# Patient Record
Sex: FEMALE | Race: BLACK OR AFRICAN AMERICAN
[De-identification: names, ages, dates, MRNs, and addresses within clinical notes are randomized per-mention and may not be internally consistent; named-entity substitution may affect disease eponyms.]

---

## 2017-12-30 ENCOUNTER — HOSPITAL ENCOUNTER (EMERGENCY)
Dept: HOSPITAL 62 - ER | Age: 32
Discharge: HOME | End: 2017-12-30
Payer: SELF-PAY

## 2017-12-30 VITALS — DIASTOLIC BLOOD PRESSURE: 62 MMHG | SYSTOLIC BLOOD PRESSURE: 108 MMHG

## 2017-12-30 DIAGNOSIS — R10.2: ICD-10-CM

## 2017-12-30 DIAGNOSIS — O26.891: ICD-10-CM

## 2017-12-30 DIAGNOSIS — Z3A.09: ICD-10-CM

## 2017-12-30 DIAGNOSIS — O20.9: Primary | ICD-10-CM

## 2017-12-30 DIAGNOSIS — O36.0910: ICD-10-CM

## 2017-12-30 LAB
ADD MANUAL DIFF: NO
APPEARANCE UR: CLEAR
APTT PPP: YELLOW S
BASOPHILS # BLD AUTO: 0 10^3/UL (ref 0–0.2)
BASOPHILS NFR BLD AUTO: 0.4 % (ref 0–2)
BILIRUB UR QL STRIP: NEGATIVE
EOSINOPHIL # BLD AUTO: 0.1 10^3/UL (ref 0–0.6)
EOSINOPHIL NFR BLD AUTO: 1.4 % (ref 0–6)
ERYTHROCYTE [DISTWIDTH] IN BLOOD BY AUTOMATED COUNT: 13.8 % (ref 11.5–14)
GLUCOSE UR STRIP-MCNC: NEGATIVE MG/DL
HCT VFR BLD CALC: 35.6 % (ref 36–47)
HGB BLD-MCNC: 12.1 G/DL (ref 12–15.5)
KETONES UR STRIP-MCNC: 20 MG/DL
LYMPHOCYTES # BLD AUTO: 2 10^3/UL (ref 0.5–4.7)
LYMPHOCYTES NFR BLD AUTO: 24.2 % (ref 13–45)
MCH RBC QN AUTO: 29.2 PG (ref 27–33.4)
MCHC RBC AUTO-ENTMCNC: 34 G/DL (ref 32–36)
MCV RBC AUTO: 86 FL (ref 80–97)
MONOCYTES # BLD AUTO: 0.6 10^3/UL (ref 0.1–1.4)
MONOCYTES NFR BLD AUTO: 7.4 % (ref 3–13)
NEUTROPHILS # BLD AUTO: 5.4 10^3/UL (ref 1.7–8.2)
NEUTS SEG NFR BLD AUTO: 66.6 % (ref 42–78)
NITRITE UR QL STRIP: NEGATIVE
PH UR STRIP: 6 [PH] (ref 5–9)
PLATELET # BLD: 164 10^3/UL (ref 150–450)
PROT UR STRIP-MCNC: NEGATIVE MG/DL
RBC # BLD AUTO: 4.15 10^6/UL (ref 3.72–5.28)
SP GR UR STRIP: 1.01
TOTAL CELLS COUNTED % (AUTO): 100 %
UROBILINOGEN UR-MCNC: NEGATIVE MG/DL (ref ?–2)
WBC # BLD AUTO: 8.2 10^3/UL (ref 4–10.5)

## 2017-12-30 PROCEDURE — 86850 RBC ANTIBODY SCREEN: CPT

## 2017-12-30 PROCEDURE — 96361 HYDRATE IV INFUSION ADD-ON: CPT

## 2017-12-30 PROCEDURE — 76817 TRANSVAGINAL US OBSTETRIC: CPT

## 2017-12-30 PROCEDURE — 86901 BLOOD TYPING SEROLOGIC RH(D): CPT

## 2017-12-30 PROCEDURE — 99284 EMERGENCY DEPT VISIT MOD MDM: CPT

## 2017-12-30 PROCEDURE — 86900 BLOOD TYPING SEROLOGIC ABO: CPT

## 2017-12-30 PROCEDURE — 96372 THER/PROPH/DIAG INJ SC/IM: CPT

## 2017-12-30 PROCEDURE — 84702 CHORIONIC GONADOTROPIN TEST: CPT

## 2017-12-30 PROCEDURE — 36415 COLL VENOUS BLD VENIPUNCTURE: CPT

## 2017-12-30 PROCEDURE — 85025 COMPLETE CBC W/AUTO DIFF WBC: CPT

## 2017-12-30 PROCEDURE — 96374 THER/PROPH/DIAG INJ IV PUSH: CPT

## 2017-12-30 PROCEDURE — 81001 URINALYSIS AUTO W/SCOPE: CPT

## 2017-12-30 PROCEDURE — 81025 URINE PREGNANCY TEST: CPT

## 2017-12-30 NOTE — ER DOCUMENT REPORT
ED Medical Screen (RME)





- General


Chief Complaint: Vag Bleeding, +preg <12wks


Stated Complaint: VAGINAL BLEEDING/CRAMPING


Time Seen by Provider: 17 17:25


Information source: Patient


Notes: 





 003 at 13 weeks by dates with 9 days of some pelvic cramping initially 

with bright red blood.  Bleeding is now darker.  No fevers or vomiting.  No 

pain with urination.  No radiation of the pain.  Some suprapubic cramping 

without radiation.


TRAVEL OUTSIDE OF THE [place] IN LAST 30 DAYS: No





- Related Data


Allergies/Adverse Reactions: 


 





No Known Allergies Allergy (Verified 17 16:31)


 











Past Medical History


Past Surgical History: Reports: Hx Abdominal Surgery - hernia





- Immunizations


Hx Diphtheria, Pertussis, Tetanus Vaccination: Yes





Physical Exam





- Vital signs


Vitals: 





 











Temp Pulse Resp BP Pulse Ox


 


 98.5 F   77   20   110/65   100 


 


 17 16:43  17 16:43  17 16:43  17 16:43  17 16:43














Course





- Vital Signs


Vital signs: 





 











Temp Pulse Resp BP Pulse Ox


 


 98.5 F   77   20   110/65   100 


 


 17 16:43  17 16:43  17 16:43  17 16:43  17 16:43

## 2017-12-30 NOTE — ER DOCUMENT REPORT
ED GI/





- General


Mode of Arrival: Ambulatory


Information source: Patient


TRAVEL OUTSIDE OF THE [place] IN LAST 30 DAYS: No





- HPI


Patient complains to provider of: Abdominal pain - cramping


Associated symptoms: Other - see notes above





<DEBRA MAZA - Last Filed: 17 19:45>





<YENNIFER HAINES - Last Filed: 17 00:27>





- General


Chief Complaint: Vag Bleeding, +preg <12wks


Stated Complaint: VAGINAL BLEEDING/CRAMPING


Time Seen by Provider: 17 17:25


Notes: 





32 year old pregnant female () presents to the ED complaining of pelvic 

cramping and some bleeding that started 9 days ago. Patient is blood type O-. (

DEBRA MAZA)





- Related Data


Allergies/Adverse Reactions: 


 





No Known Allergies Allergy (Verified 17 16:31)


 











Past Medical History





- General


Information source: Patient





- Social History


Smoking Status: Never Smoker


Chew tobacco use (# tins/day): No


Frequency of alcohol use: None


Drug Abuse: None


Family History: Reviewed & Not Pertinent


Patient has suicidal ideation: No


Patient has homicidal ideation: No


Renal/ Medical History: Denies: Hx Peritoneal Dialysis


Past Surgical History: Reports: Hx Abdominal Surgery - hernia, Hx Herniorrhaphy





- Immunizations


Hx Diphtheria, Pertussis, Tetanus Vaccination: Yes





<DEBRA MAZA - Last Filed: 17 19:45>





Review of Systems





- Review of Systems


Constitutional: No symptoms reported


EENT: No symptoms reported


Cardiovascular: No symptoms reported


Respiratory: No symptoms reported


Gastrointestinal: No symptoms reported


Genitourinary: No symptoms reported


Female Genitourinary: See HPI, Pregnant, Vaginal bleeding, Other - pelvic 

cramping


Musculoskeletal: No symptoms reported


Skin: No symptoms reported


Hematologic/Lymphatic: No symptoms reported


Neurological/Psychological: No symptoms reported


-: Yes All other systems reviewed and negative





<DEBRA MAZA - Last Filed: 17 19:45>





Physical Exam





- General


General appearance: Alert


In distress: None





- HEENT


Head: Normocephalic, Atraumatic


Eyes: Normal


Extraocular movements intact: Yes


Pupils: PERRL





- Respiratory


Respiratory status: No respiratory distress





- Cardiovascular


Rhythm: Regular





- Abdominal


Inspection: Normal


Distension: No distension


Tenderness: Tender - Mild suprapubic tenderness to palpation





- Back


Back: Normal





- Extremities


General upper extremity: Normal inspection, Normal ROM


General lower extremity: Normal inspection, Normal ROM





- Neurological


Neuro grossly intact: Yes


Cognition: Normal


Orientation: AAOx4


Rik Coma Scale Eye Opening: Spontaneous


Rik Coma Scale Verbal: Oriented


[place] Coma Scale Motor: Obeys Commands


[place] Coma Scale Total: 15





- Psychological


Associated symptoms: Normal affect, Normal mood





- Skin


Skin Temperature: Warm


Skin Moisture: Dry


Skin Color: Normal





<DEBRA MAZA - Last Filed: 17 19:45>





- Vital signs


Vitals: 


 











Temp Pulse Resp BP Pulse Ox


 


 98.5 F   77   20   110/65   100 


 


 17 16:43  17 16:43  17 16:43  17 16:43  17 16:43














Course





- Laboratory


Result Diagrams: 


 17 19:20








<DEBRA MAZA - Last Filed: 17 19:45>





- Laboratory


Result Diagrams: 


 17 19:20








<YENNIFER HAINES - Last Filed: 17 00:27>





- Re-evaluation


Re-evalutation: 





17


Patient is a 32-year-old female with a positive home pregnancy test who comes 

in with some vaginal bleeding and cramping.  Patient is 9 weeks pregnant and 

ultrasound with also possible corpus luteal cyst.  Symptoms have resolved 

fluids.  Patient is also had nausea and this pregnancy and was given Reglan 

here.  Feels better.  Will follow up with OB/GYN.  Of note, she is Rh- and has 

received RhoGam today.


 (YENNIFER HAINES)





- Vital Signs


Vital signs: 


 











Temp Pulse Resp BP Pulse Ox


 


 98.5 F   78   18   108/62   98 


 


 17 16:43  17 22:47  17 22:47  17 22:47  17 22:47














- Laboratory


Laboratory results interpreted by me: 


 











  17





  19:20 19:20 19:53


 


Hct  35.6 L  


 


Beta HCG, Quant   595108.00 H 


 


Urine Ketones    20 H


 


Urine HCG, Qual    POSITIVE H














Discharge





<DEBRA MAZA - Last Filed: 17 19:45>





<YENNIFER HAINES - Last Filed: 17 00:27>





- Discharge


Clinical Impression: 


 Bleeding in early pregnancy, Need for rhogam due to Rh negative mother





Condition: Stable


Disposition: HOME, SELF-CARE


Instructions:  Bleeding During Early Pregnancy ([facility]), Rhogam ([facility])


Additional Instructions: 


Please follow-up with your doctor this week.


Prescriptions: 


Metoclopramide HCl [Reglan 10 mg Tablet] 1 tab PO TIDP PRN #30 tablet


 PRN Reason: 


Metoclopramide HCl [Reglan 10 mg Tablet] 1 tab PO TIDP PRN #30 tablet


 PRN Reason: 


Forms:  Return to Work


Scribe Attestation: 





17 00:26


I personally performed the services described in the documentation, reviewed 

and edited the documentation which was dictated to the scribe in my presence, 

and it accurately records my words and actions. (YENNIFER HAINES)





Scribe Documentation





- Scribe


Written by Sandra:: Sandra Umana, 2017 1948


acting as scribe for :: Rubia





<DEBRA MAZA - Last Filed: 17 19:45>

## 2017-12-30 NOTE — RADIOLOGY REPORT (SQ)
EXAM DESCRIPTION:  U/S OB TRANSVAGINAL W/O DOP



COMPLETED DATE/TIME:  12/30/2017 7:07 pm



REASON FOR STUDY:  PIT; preg with vag bleeding



COMPARISON:  None.



TECHNIQUE:  Transvaginal static and realtime grayscale images acquired of the pelvis. Additional mónica
cted spectral and color Doppler images recorded. All images stored on PACs.

bHCG: Pending.



LIMITATIONS:  None.



FINDINGS:  FETUS: Living intrauterine pregnancy.

EGA: 9 week, 1 day

LEAH: 8/3/2018

FHR: 173  beats per minute.

SUBCHORIONIC BLEED: No

SIZE OF BLEED: Not applicable.

UTERUS: No masses. No anomalies.

CERVICAL LENGTH: 4.0 cm   Closed.

RIGHT ADNEXA: Normal ovary with normal vascular flow.

No adnexal free fluid.

No adnexal masses.

LEFT ADNEXA: Normal ovary with normal vascular flow.

No adnexal free fluid.

No adnexal masses.

FREE FLUID: None.

OTHER: No other significant finding.



IMPRESSION:  LIVING INTRAUTERINE PREGNANCY.

EGA 9 weeks, 1 day

Trimester of pregnancy:  First - 0 to 13 weeks.



TECHNICAL DOCUMENTATION:  JOB ID:  [id number]

 2011 [employer]- All Rights Reserved

## 2018-02-06 ENCOUNTER — HOSPITAL ENCOUNTER (EMERGENCY)
Dept: HOSPITAL 62 - ER | Age: 33
Discharge: HOME | End: 2018-02-06
Payer: SELF-PAY

## 2018-02-06 VITALS — DIASTOLIC BLOOD PRESSURE: 56 MMHG | SYSTOLIC BLOOD PRESSURE: 108 MMHG

## 2018-02-06 DIAGNOSIS — R11.0: ICD-10-CM

## 2018-02-06 DIAGNOSIS — Z3A.14: ICD-10-CM

## 2018-02-06 DIAGNOSIS — R19.7: ICD-10-CM

## 2018-02-06 DIAGNOSIS — O41.92X0: Primary | ICD-10-CM

## 2018-02-06 DIAGNOSIS — R10.2: ICD-10-CM

## 2018-02-06 DIAGNOSIS — O26.892: ICD-10-CM

## 2018-02-06 DIAGNOSIS — R51: ICD-10-CM

## 2018-02-06 LAB
A1 MICROGLOB PLACENTAL VAG QL: POSITIVE
ADD MANUAL DIFF: NO
ALBUMIN SERPL-MCNC: 4.2 G/DL (ref 3.5–5)
ALP SERPL-CCNC: 43 U/L (ref 38–126)
ALT SERPL-CCNC: 20 U/L (ref 9–52)
ANION GAP SERPL CALC-SCNC: 10 MMOL/L (ref 5–19)
APPEARANCE UR: (no result)
APTT PPP: (no result) S
AST SERPL-CCNC: 20 U/L (ref 14–36)
BASOPHILS # BLD AUTO: 0 10^3/UL (ref 0–0.2)
BASOPHILS NFR BLD AUTO: 0.3 % (ref 0–2)
BILIRUB DIRECT SERPL-MCNC: 0 MG/DL (ref 0–0.4)
BILIRUB SERPL-MCNC: 0.4 MG/DL (ref 0.2–1.3)
BILIRUB UR QL STRIP: NEGATIVE
BUN SERPL-MCNC: 8 MG/DL (ref 7–20)
CALCIUM: 9.8 MG/DL (ref 8.4–10.2)
CHLORIDE SERPL-SCNC: 104 MMOL/L (ref 98–107)
CO2 SERPL-SCNC: 23 MMOL/L (ref 22–30)
EOSINOPHIL # BLD AUTO: 0.1 10^3/UL (ref 0–0.6)
EOSINOPHIL NFR BLD AUTO: 1.2 % (ref 0–6)
ERYTHROCYTE [DISTWIDTH] IN BLOOD BY AUTOMATED COUNT: 14.5 % (ref 11.5–14)
GLUCOSE SERPL-MCNC: 82 MG/DL (ref 75–110)
GLUCOSE UR STRIP-MCNC: NEGATIVE MG/DL
HCT VFR BLD CALC: 34.5 % (ref 36–47)
HGB BLD-MCNC: 11.5 G/DL (ref 12–15.5)
KETONES UR STRIP-MCNC: NEGATIVE MG/DL
LYMPHOCYTES # BLD AUTO: 1.2 10^3/UL (ref 0.5–4.7)
LYMPHOCYTES NFR BLD AUTO: 16.3 % (ref 13–45)
MCH RBC QN AUTO: 29.1 PG (ref 27–33.4)
MCHC RBC AUTO-ENTMCNC: 33.3 G/DL (ref 32–36)
MCV RBC AUTO: 87 FL (ref 80–97)
MONOCYTES # BLD AUTO: 0.4 10^3/UL (ref 0.1–1.4)
MONOCYTES NFR BLD AUTO: 5.1 % (ref 3–13)
NEUTROPHILS # BLD AUTO: 5.5 10^3/UL (ref 1.7–8.2)
NEUTS SEG NFR BLD AUTO: 77.1 % (ref 42–78)
NITRITE UR QL STRIP: NEGATIVE
PH UR STRIP: 9 [PH] (ref 5–9)
PLATELET # BLD: 163 10^3/UL (ref 150–450)
POTASSIUM SERPL-SCNC: 4.1 MMOL/L (ref 3.6–5)
PROT SERPL-MCNC: 6.4 G/DL (ref 6.3–8.2)
PROT UR STRIP-MCNC: NEGATIVE MG/DL
RBC # BLD AUTO: 3.95 10^6/UL (ref 3.72–5.28)
SODIUM SERPL-SCNC: 136.6 MMOL/L (ref 137–145)
SP GR UR STRIP: 1
TOTAL CELLS COUNTED % (AUTO): 100 %
UROBILINOGEN UR-MCNC: NEGATIVE MG/DL (ref ?–2)
WBC # BLD AUTO: 7.2 10^3/UL (ref 4–10.5)

## 2018-02-06 PROCEDURE — 80053 COMPREHEN METABOLIC PANEL: CPT

## 2018-02-06 PROCEDURE — 81001 URINALYSIS AUTO W/SCOPE: CPT

## 2018-02-06 PROCEDURE — 84112 EVAL AMNIOTIC FLUID PROTEIN: CPT

## 2018-02-06 PROCEDURE — 76815 OB US LIMITED FETUS(S): CPT

## 2018-02-06 PROCEDURE — 36415 COLL VENOUS BLD VENIPUNCTURE: CPT

## 2018-02-06 PROCEDURE — 96376 TX/PRO/DX INJ SAME DRUG ADON: CPT

## 2018-02-06 PROCEDURE — 86900 BLOOD TYPING SEROLOGIC ABO: CPT

## 2018-02-06 PROCEDURE — 96375 TX/PRO/DX INJ NEW DRUG ADDON: CPT

## 2018-02-06 PROCEDURE — 86901 BLOOD TYPING SEROLOGIC RH(D): CPT

## 2018-02-06 PROCEDURE — 84702 CHORIONIC GONADOTROPIN TEST: CPT

## 2018-02-06 PROCEDURE — 99284 EMERGENCY DEPT VISIT MOD MDM: CPT

## 2018-02-06 PROCEDURE — 85025 COMPLETE CBC W/AUTO DIFF WBC: CPT

## 2018-02-06 PROCEDURE — 96374 THER/PROPH/DIAG INJ IV PUSH: CPT

## 2018-02-06 PROCEDURE — 96361 HYDRATE IV INFUSION ADD-ON: CPT

## 2018-02-06 NOTE — ER DOCUMENT REPORT
ED GI/





- General


Chief Complaint: Abdominal Pain


Stated Complaint: PREGNANCY PROBLEM


Time Seen by Provider: 18 08:26


Mode of Arrival: Ambulatory


Information source: Patient


Notes: 





She is currently 14 weeks pregnant .  Patient presents stating that she 

developed lower cramping to the abdomen today and has been having clear fluid 

leaking vaginally.  Patient denies any vaginal bleeding.  Patient does complain 

of some nausea, diarrhea and headache symptoms.  Patient denies any vomiting.


TRAVEL OUTSIDE OF THE [place] IN LAST 30 DAYS: No





- HPI


Patient complains to provider of: Pelvic pain, Pregnant


Timing/Duration: Gradual


Quality of pain: Cramping


Pain Level: 3


Context: Pregnant


Location: Pelvis


Vaginal bleeding (Compared to normal period): None


Sexual history: Active


Associated symptoms: Nausea, Vaginal discharge.  denies: Dysuria, Fever, 

Urinary hesitancy, Urinary frequency, Urinary retention, Urinary urgency, 

Vomiting


Exacerbated by: Denies


Relieved by: Denies


Similar symptoms previously: No


Recently seen / treated by doctor: No





- Related Data


Allergies/Adverse Reactions: 


 





No Known Allergies Allergy (Verified 18 08:17)


 











Past Medical History





- General


Information source: Patient





- Social History


Smoking Status: Never Smoker


Frequency of alcohol use: None


Drug Abuse: None


Occupation: Call center


Lives with: Family


Family History: Reviewed & Not Pertinent





- Medical History


Medical History: Negative


Renal/ Medical History: Denies: Hx Peritoneal Dialysis


Past Surgical History: Reports: Hx Abdominal Surgery - hernia, Hx Herniorrhaphy





- Immunizations


Hx Diphtheria, Pertussis, Tetanus Vaccination: Yes





Review of Systems





- Review of Systems


Constitutional: No symptoms reported.  denies: Fever, Recent illness


EENT: No symptoms reported


Cardiovascular: No symptoms reported.  denies: Chest pain


Respiratory: No symptoms reported.  denies: Cough, Short of breath


Gastrointestinal: Abdominal pain, Nausea.  denies: Diarrhea, Vomiting


Genitourinary: No symptoms reported.  denies: Dysuria, Flank pain


Female Genitourinary: Pregnant, Vaginal discharge - Clear fluid leaking


Musculoskeletal: No symptoms reported


Skin: No symptoms reported


Hematologic/Lymphatic: No symptoms reported


Neurological/Psychological: Headaches.  denies: Confusion, Weakness





Physical Exam





- Vital signs


Vitals: 


 











Temp Pulse Resp BP Pulse Ox


 


 98.8 F   86   20   121/60   100 


 


 18 08:23  18 08:23  18 08:23  18 08:23  18 08:23














- General


General appearance: Appears well, Alert, Anxious


In distress: None





- Respiratory


Respiratory status: No respiratory distress


Chest status: Nontender


Breath sounds: Normal.  No: Rales, Rhonchi, Stridor, Wheezing


Chest palpation: Normal





- Cardiovascular


Rhythm: Regular


Heart sounds: S1 appreciated, S2 appreciated


Murmur: No





- Abdominal


Inspection: Normal


Distension: No distension


Bowel sounds: Normal


Tenderness: Tender - lower pelvic


Organomegaly: No organomegaly





- Back


Back: Normal, Nontender.  No: CVA tenderness





- Extremities


General upper extremity: Normal inspection, Nontender, Normal strength


General lower extremity: Normal inspection, Nontender, Normal strength





- Neurological


Neuro grossly intact: Yes


Cognition: Normal


[place] Coma Scale Eye Opening: Spontaneous


Rik Coma Scale Verbal: Oriented


[place] Coma Scale Motor: Obeys Commands


[place] Coma Scale Total: 15





- Psychological


Associated symptoms: Anxious





- Skin


Skin Temperature: Warm


Skin Moisture: Dry


Skin Color: Normal





Course





- Re-evaluation


Re-evalutation: 





18 10:21


Dr. Ribera called with reports of ultrasound, consulted with Dr. Pearce who 

recommends adding on an amateur test and then consulting with OB/GYN regarding 

the results.


18 10:30


Consulted with Dr. Pavon regarding patient presentation.  Dr. Pavon states that 

there is nothing that can be done to stop the amniotic fluid leak and advises 

having patient follow up with the OB/GYN as an outpatient basis.  Suspect that 

patient will likely miscarry.


18 11:07


Amnisure test positive for amniotic fluid.  Patient advised of conversation 

with OB/GYN provider as well as results of diagnostic test.  Patient encouraged 

to return as needed for any worsening symptoms.  Patient encouraged to stay well

-hydrated at home.





- Vital Signs


Vital signs: 


 











Temp Pulse Resp BP Pulse Ox


 


 98.5 F   65   20   108/56 L  100 


 


 18 12:21  18 12:21  18 08:23  18 12:21  18 12:21














- Laboratory


Result Diagrams: 


 18 09:18





 18 09:18


Laboratory results interpreted by me: 


 











  18





  09:18 09:18 10:47


 


Hgb  11.5 L  


 


Hct  34.5 L  


 


RDW  14.5 H  


 


Sodium   136.6 L 


 


Beta HCG, Quant   83659.00 H 


 


Fetal Membrane Rupture    POSITIVE H














 Labs- Entire Visit











  18





  09:18 09:18 09:18


 


WBC  7.2  


 


RBC  3.95  


 


Hgb  11.5 L  


 


Hct  34.5 L  


 


MCV  87  


 


MCH  29.1  


 


MCHC  33.3  


 


RDW  14.5 H  


 


Plt Count  163  


 


Seg Neutrophils %  77.1  


 


Lymphocytes %  16.3  


 


Monocytes %  5.1  


 


Eosinophils %  1.2  


 


Basophils %  0.3  


 


Absolute Neutrophils  5.5  


 


Absolute Lymphocytes  1.2  


 


Absolute Monocytes  0.4  


 


Absolute Eosinophils  0.1  


 


Absolute Basophils  0.0  


 


Sodium   136.6 L 


 


Potassium   4.1 


 


Chloride   104 


 


Carbon Dioxide   23 


 


Anion Gap   10 


 


BUN   8 


 


Creatinine   0.53 


 


Est GFR ( Amer)   > 60 


 


Est GFR (Non-Af Amer)   > 60 


 


Glucose   82 


 


Calcium   9.8 


 


Total Bilirubin   0.4 


 


Direct Bilirubin   0.0 


 


Neonat Total Bilirubin   Not Reportable 


 


Neonat Direct Bilirubin   Not Reportable 


 


Neonat Indirect Bili   Not Reportable 


 


AST   20 


 


ALT   20 


 


Alkaline Phosphatase   43 


 


Total Protein   6.4 


 


Albumin   4.2 


 


Beta HCG, Quant   76845.00 H 


 


Total Beta HCG   POSITIVE 


 


Urine Color   


 


Urine Appearance   


 


Urine pH   


 


Ur Specific Gravity   


 


Urine Protein   


 


Urine Glucose (UA)   


 


Urine Ketones   


 


Urine Blood   


 


Urine Nitrite   


 


Urine Bilirubin   


 


Urine Urobilinogen   


 


Ur Leukocyte Esterase   


 


Urine WBC (Auto)   


 


Urine RBC (Auto)   


 


Squamous Epi Cells Auto   


 


Urine Mucus (Auto)   


 


Urine Ascorbic Acid   


 


Fetal Membrane Rupture   


 


Blood Type    O NEGATIVE


 


Rhogam Indicated    RHOGAM NOT REQUESTED














  18





  09:54 10:47


 


WBC  


 


RBC  


 


Hgb  


 


Hct  


 


MCV  


 


MCH  


 


MCHC  


 


RDW  


 


Plt Count  


 


Seg Neutrophils %  


 


Lymphocytes %  


 


Monocytes %  


 


Eosinophils %  


 


Basophils %  


 


Absolute Neutrophils  


 


Absolute Lymphocytes  


 


Absolute Monocytes  


 


Absolute Eosinophils  


 


Absolute Basophils  


 


Sodium  


 


Potassium  


 


Chloride  


 


Carbon Dioxide  


 


Anion Gap  


 


BUN  


 


Creatinine  


 


Est GFR ( Amer)  


 


Est GFR (Non-Af Amer)  


 


Glucose  


 


Calcium  


 


Total Bilirubin  


 


Direct Bilirubin  


 


Neonat Total Bilirubin  


 


Neonat Direct Bilirubin  


 


Neonat Indirect Bili  


 


AST  


 


ALT  


 


Alkaline Phosphatase  


 


Total Protein  


 


Albumin  


 


Beta HCG, Quant  


 


Total Beta HCG  


 


Urine Color  STRAW 


 


Urine Appearance  SLIGHTLY-CLOUDY 


 


Urine pH  9.0 


 


Ur Specific Gravity  1.004 


 


Urine Protein  NEGATIVE 


 


Urine Glucose (UA)  NEGATIVE 


 


Urine Ketones  NEGATIVE 


 


Urine Blood  NEGATIVE 


 


Urine Nitrite  NEGATIVE 


 


Urine Bilirubin  NEGATIVE 


 


Urine Urobilinogen  NEGATIVE 


 


Ur Leukocyte Esterase  NEGATIVE 


 


Urine WBC (Auto)  0 


 


Urine RBC (Auto)  0 


 


Squamous Epi Cells Auto  1 


 


Urine Mucus (Auto)  RARE 


 


Urine Ascorbic Acid  NEGATIVE 


 


Fetal Membrane Rupture   POSITIVE H


 


Blood Type  


 


Rhogam Indicated  














- Diagnostic Test


Radiology reviewed: Reports reviewed





Discharge





- Discharge


Clinical Impression: 


 Abdominal pain affecting pregnancy





Amniotic fluid abnormal


Qualifiers:


 Fetus number: single or unspecified fetus Trimester: second trimester 

Qualified Code(s): O41.92X0 - Disorder of amniotic fluid and membranes, 

unspecified, second trimester, not applicable or unspecified





Condition: Stable


Disposition: HOME, SELF-CARE


Instructions:  Acetaminophen, Headache ([facility]), Intravenous (IV) Fluids ([facility])


Additional Instructions: 


Return immediately for any new or worsening symptoms





Followup with your OB/GYN care provider, call today to make a followup 

appointment





Stay well-hydrated





Pelvic rest until cleared by your OB/GYN provider








Forms:  Return to Work


Referrals: 


[facility] [Provider Group] - Follow up as needed


[facility] - Follow up tomorrow

## 2018-02-06 NOTE — RADIOLOGY REPORT (SQ)
EXAM DESCRIPTION:  U/S OB LIMITED



COMPLETED DATE/TIME:  2/6/2018 9:15 am



REASON FOR STUDY:  pelvic/low back pain, leaking fluid



COMPARISON:  OB ultrasound 12/30/2017



TECHNIQUE:  Limited transabdominal grayscale ultrasound for evaluation of specific requested obstetri
susana parameters.



LIMITATIONS:  None.



FINDINGS:  CERVICAL LENGTH: 4.3 cm   Closed.

KASSIDY: Largest pocket 1 cm.  There is oligohydramnios.

FHR: 149 beats per minute.

PRESENTATION: Transverse

OTHER: No other significant findings.



IMPRESSION:  Oligohydramnios.

14 week 2 day fetus, heart rate 149 beats per minute.

Trimester of pregnancy: Second trimester - 13 weeks 1 day to 27 weeks 6 days.



TECHNICAL DOCUMENTATION:  JOB ID:  [id number]

 2011 [employer]- All Rights Reserved

## 2018-03-21 ENCOUNTER — HOSPITAL ENCOUNTER (INPATIENT)
Dept: HOSPITAL 62 - LC | Age: 33
LOS: 2 days | Discharge: HOME | DRG: 781 | End: 2018-03-23
Attending: OBSTETRICS & GYNECOLOGY | Admitting: OBSTETRICS & GYNECOLOGY
Payer: MEDICAID

## 2018-03-21 DIAGNOSIS — G43.909: ICD-10-CM

## 2018-03-21 DIAGNOSIS — O23.512: ICD-10-CM

## 2018-03-21 DIAGNOSIS — Z3A.20: ICD-10-CM

## 2018-03-21 DIAGNOSIS — O42.90: Primary | ICD-10-CM

## 2018-03-21 DIAGNOSIS — O99.352: ICD-10-CM

## 2018-03-21 LAB
ADD MANUAL DIFF: NO
APPEARANCE UR: (no result)
APTT PPP: YELLOW S
BARBITURATES UR QL SCN: NEGATIVE
BASOPHILS # BLD AUTO: 0 10^3/UL (ref 0–0.2)
BASOPHILS NFR BLD AUTO: 0.2 % (ref 0–2)
BILIRUB UR QL STRIP: NEGATIVE
EOSINOPHIL # BLD AUTO: 0 10^3/UL (ref 0–0.6)
EOSINOPHIL NFR BLD AUTO: 0.4 % (ref 0–6)
ERYTHROCYTE [DISTWIDTH] IN BLOOD BY AUTOMATED COUNT: 13.8 % (ref 11.5–14)
GLUCOSE UR STRIP-MCNC: NEGATIVE MG/DL
HCT VFR BLD CALC: 31.2 % (ref 36–47)
HGB BLD-MCNC: 10.5 G/DL (ref 12–15.5)
KETONES UR STRIP-MCNC: (no result) MG/DL
LYMPHOCYTES # BLD AUTO: 0.8 10^3/UL (ref 0.5–4.7)
LYMPHOCYTES NFR BLD AUTO: 8 % (ref 13–45)
MCH RBC QN AUTO: 29.7 PG (ref 27–33.4)
MCHC RBC AUTO-ENTMCNC: 33.6 G/DL (ref 32–36)
MCV RBC AUTO: 89 FL (ref 80–97)
METHADONE UR QL SCN: NEGATIVE
MONOCYTES # BLD AUTO: 0.6 10^3/UL (ref 0.1–1.4)
MONOCYTES NFR BLD AUTO: 5.3 % (ref 3–13)
NEUTROPHILS # BLD AUTO: 8.9 10^3/UL (ref 1.7–8.2)
NEUTS SEG NFR BLD AUTO: 86.1 % (ref 42–78)
NITRITE UR QL STRIP: NEGATIVE
PCP UR QL SCN: NEGATIVE
PH UR STRIP: 7 [PH] (ref 5–9)
PLATELET # BLD: 164 10^3/UL (ref 150–450)
PROT UR STRIP-MCNC: NEGATIVE MG/DL
RBC # BLD AUTO: 3.53 10^6/UL (ref 3.72–5.28)
SP GR UR STRIP: 1
TOTAL CELLS COUNTED % (AUTO): 100 %
URINE AMPHETAMINES SCREEN: NEGATIVE
URINE BENZODIAZEPINES SCREEN: NEGATIVE
URINE COCAINE SCREEN: NEGATIVE
URINE MARIJUANA (THC) SCREEN: (no result)
UROBILINOGEN UR-MCNC: 4 MG/DL (ref ?–2)
WBC # BLD AUTO: 10.4 10^3/UL (ref 4–10.5)

## 2018-03-21 PROCEDURE — 87210 SMEAR WET MOUNT SALINE/INK: CPT

## 2018-03-21 PROCEDURE — 36415 COLL VENOUS BLD VENIPUNCTURE: CPT

## 2018-03-21 PROCEDURE — G0378 HOSPITAL OBSERVATION PER HR: HCPCS

## 2018-03-21 PROCEDURE — 85025 COMPLETE CBC W/AUTO DIFF WBC: CPT

## 2018-03-21 PROCEDURE — G0379 DIRECT REFER HOSPITAL OBSERV: HCPCS

## 2018-03-21 PROCEDURE — 87591 N.GONORRHOEAE DNA AMP PROB: CPT

## 2018-03-21 PROCEDURE — 80307 DRUG TEST PRSMV CHEM ANLYZR: CPT

## 2018-03-21 PROCEDURE — 87491 CHLMYD TRACH DNA AMP PROBE: CPT

## 2018-03-21 PROCEDURE — G0480 DRUG TEST DEF 1-7 CLASSES: HCPCS

## 2018-03-21 PROCEDURE — 81001 URINALYSIS AUTO W/SCOPE: CPT

## 2018-03-21 PROCEDURE — 76815 OB US LIMITED FETUS(S): CPT

## 2018-03-21 NOTE — ADMISSION PHYSICAL
=================================================================



=================================================================

Datetime Report Generated by CPN: 2018 20:06

   

   

=================================================================

CURRENT ADMISSION

=================================================================

   

Chief Complaint:  Uterine Contractions; Suspected Ruptured Membranes

Indication for Induction:  Not Applicable

Admit Plan:  Admit to Unit; Observation/Evaluation

   

=================================================================

ALLERGIES

=================================================================

   

Medication Allergies:  No

Medication Allergies:  No Known Allergies (2018)

Latex:  Latex Allergies

Food Allergies:  N/A

   

=================================================================

OBSTETRICAL HISTORY

=================================================================

   

EDC:  2018 00:00

:  4

Para:  2

:  1

Living:  3

Gestational Diabetes:  No

Rh Sensitization:  No

Incompetent Cervix:  No

WILBERTO:  No

Infertility:  No

ART Treatment:  No

Uterine Anomaly:  No

IUGR:  No

Hx Previous C/S:  No

Macrosomia:  No

Hx Loss/Stillborn:  No

PIH:  No

Hx  Death:  No

Placenta Previa/Abruption:  No

Depression/PP Depression:  No

PTL/PROM:  No

Post Partum Hemorrhage:  No

Current Pregnancy Procedures:  Ultrasound

Obstetrical History Comments:  G1 - , , boy 41 weeks

   G2 - , , boy 40 weeks

   G3 - 2011, , boy, 36 weeks

   G4 - current pregnancy

   

=================================================================

***SEE PRENATAL RECORDS***

=================================================================

   

Alcohol:  No

Marijuana :  No

Cocaine:  No

Other Illicit Drugs:  No

Cigarettes:  Never Smoker. [id number]

   

=================================================================

MEDICAL HISTORY

=================================================================

   

Diabetes:  No

Blood Transfusion:  No

Pulmonary Disease (Asthma, TB):  No

Breast Disease:  No

Hypertension:  No

Gyn Surgery:  No

Heart Disease:  No

Hosp/Surgery:  No

Autoimmune Disorder:  No

Anesthetic Complications:  No

Kidney Disease:  No

Abnormal Pap Smear:  Yes

Neuro/Epilepsy:  No

Psychiatric Disorders:  No

Other Medical Diseases:  No

Hepatitis/Liver Disease:  No

Significant Family History:  No

Varicosities/Phlebitis:  No

Trauma/Violence :  No

Thyroid Dysfunction:  No

Medical History Comments:  Colpo, Chronic cervicitis, Migraines

   

=================================================================

INFECTIOUS HISTORY

=================================================================

   

Gonorrhea:  No

Genital Herpes:  No

Chlamydia:  No

Tuberculosis:  No

Syphilis:  No

Hepatitis:  No

HIV/AIDS Exposure:  No

Rash or Viral Illness:  No

HPV:  No

   

=================================================================

PHYSICAL EXAM

=================================================================

   

General:  Normal

HEENT:  Normal

Neurologic:  Normal

Thyroid:  Normal

Heart:  Normal

Lungs:  Normal

Breast:  Deferred

Back:  Normal

Abdomen:  Normal

Genitourinary Exam:  Normal

Extremities:  Normal

DTRs:  Normal

Pelvic Type:  Adequate

   

=================================================================

VAGINAL EXAM

=================================================================

   

Dilatation:  clposed

   

=================================================================

FETUS A

=================================================================

   

EGA:  20.5

Admit Comment:  SROM at 14 weeks c/o contractions and continuous

   leakage of fluid

   

=================================================================

PLANS FOR LABOR AND DELIVERY

=================================================================

   

Pain Management:  Medications; Epidural

   

=================================================================

INFORMED CONSENT

=================================================================

   

Signature:  Electronically signed by Roosevelt Pavon MD ([email or web address]) on

   3/21/2018 at 20:06  with User ID: [email or web address]

## 2018-03-22 LAB
BACTERIA (WET MOUNT): (no result)
CHLAM PCR: NOT DETECTED
EPITHELIALS (WET MOUNT): (no result)
GON PCR: NOT DETECTED
RBCS (WET MOUNT): (no result)
T.VAGINALIS (WET MOUNT): (no result)
WBCS (WET MOUNT): (no result)
YEAST (WET MOUNT): (no result)

## 2018-03-22 NOTE — L&D PROGRESS NOTES
=================================================================

PROGRESS NOTES

=================================================================

Datetime Report Generated by CPN: 03/22/2018 19:05

   

   

=================================================================

PROGRESS NOTE

=================================================================

   

Impression:  Premature Rupture of Membranes

Procedures- Other:  pain

Plan:  Continue Present Management

Informed Consent Obtained:  Vaginal Delivery; Risks, Benefits and

   Alternatives Discussed

Vital Signs :  Reviewed

Comment:  Pt continues to report abdominal and back pain and has

   requested narcotics several times today.  She is reporting irregular

   contractions.  Long discussion with patient regarding  Plan of care.

   REviewed that while she was needing pain medication so frequently

   would recommend she remain in hospital.  Will get repeat CBC in am. 

   Reviewed cont with FHTs - if absence of FHTs will proceed with IOL.

   Patient tearful about prognosis but understands plan of care

   

=================================================================

VAGINAL EXAM

=================================================================

   

Dilatation:  clposed

Contractions:  irreg

   

=================================================================

SIGNATURE

=================================================================

   

SIGNATURE:  [id number]

SIGNATURE:  [id number]

Signature:  Electronically signed by Lorna Patricia MD ([email or web address]) on

   3/22/2018 at 19:05  with User ID: [email or web address]

## 2018-03-22 NOTE — RADIOLOGY REPORT (SQ)
EXAM DESCRIPTION:  U/S OB LIMITED



COMPLETED DATE/TIME:  3/22/2018 10:21 am



REASON FOR STUDY:  20+5ega, PPROM at 14wks, eval fluid/FHR



COMPARISON:  2/6/2018



TECHNIQUE:  Limited transabdominal grayscale ultrasound for evaluation of specific requested obstetri
susana parameters.



LIMITATIONS:  None.



FINDINGS:  CERVICAL LENGTH: Not seen.

KASSIDY: No measurable fluid was seen.  .

FHR: 160 beats per minute.

PRESENTATION: Breech

OTHER: No other significant findings.



IMPRESSION:  Limited obstetrical ultrasound showing no measurable amniotic fluid.  Fetal heart rate w
as 160 beats p.m. gestational age 20 weeks 6 days with estimated date of delivery of 8/3/2018

Trimester of pregnancy: Second trimester - 13 weeks 1 day to 27 weeks 6 days.



TECHNICAL DOCUMENTATION:  JOB ID:  [id number]

 2011 [employer]- All Rights Reserved



Reading location - IP/workstation name: [email or web address]

## 2018-03-23 LAB
ADD MANUAL DIFF: NO
BASOPHILS # BLD AUTO: 0 10^3/UL (ref 0–0.2)
BASOPHILS NFR BLD AUTO: 0.2 % (ref 0–2)
EOSINOPHIL # BLD AUTO: 0.1 10^3/UL (ref 0–0.6)
EOSINOPHIL NFR BLD AUTO: 1.6 % (ref 0–6)
ERYTHROCYTE [DISTWIDTH] IN BLOOD BY AUTOMATED COUNT: 14 % (ref 11.5–14)
HCT VFR BLD CALC: 27.5 % (ref 36–47)
HGB BLD-MCNC: 9.2 G/DL (ref 12–15.5)
LYMPHOCYTES # BLD AUTO: 1.1 10^3/UL (ref 0.5–4.7)
LYMPHOCYTES NFR BLD AUTO: 16.7 % (ref 13–45)
MCH RBC QN AUTO: 29.7 PG (ref 27–33.4)
MCHC RBC AUTO-ENTMCNC: 33.4 G/DL (ref 32–36)
MCV RBC AUTO: 89 FL (ref 80–97)
MONOCYTES # BLD AUTO: 0.4 10^3/UL (ref 0.1–1.4)
MONOCYTES NFR BLD AUTO: 7 % (ref 3–13)
NEUTROPHILS # BLD AUTO: 4.7 10^3/UL (ref 1.7–8.2)
NEUTS SEG NFR BLD AUTO: 74.5 % (ref 42–78)
PLATELET # BLD: 158 10^3/UL (ref 150–450)
RBC # BLD AUTO: 3.09 10^6/UL (ref 3.72–5.28)
TOTAL CELLS COUNTED % (AUTO): 100 %
WBC # BLD AUTO: 6.3 10^3/UL (ref 4–10.5)

## 2018-03-23 NOTE — L&D PROGRESS NOTES
=================================================================

PROGRESS NOTES

=================================================================

Datetime Report Generated by CPN: 2018 10:49

   

   

=================================================================

PROGRESS NOTE

=================================================================

   

Impression:  Premature Rupture of Membranes

Informed Consent Obtained:  Risks, Benefits and Alternatives Discussed

Comment:  33 yo  here for PPROM at 14 weeks

   pt is currently 20w5d

   EDC 8/3/18

   RH negative

   Rhogam 2017

   discussed with pt/ BV will treat with flagyl

   pain well managed

   abdomen nontender

   pt afebrile 

   precautions reviewed/ signs and symptoms of infection reviewed

   rx for dilaudid given

   pt to follow up in office on monday

      

   

=================================================================

FETUS C

=================================================================

   

SIGNATURE:  [id number]

Assignment:  Hailey Calabrese MD

Signature:  Electronically signed by Iris Hudson CNM on 3/23/2018 at

   10:48  with User ID: [email or web address]

:  Electronically signed by Iris Hudson CNM on 3/23/2018 at 10:48 

   with User ID: [email or web address]

## 2018-04-02 ENCOUNTER — HOSPITAL ENCOUNTER (INPATIENT)
Dept: HOSPITAL 62 - LC | Age: 33
LOS: 1 days | Discharge: HOME | End: 2018-04-03
Attending: OBSTETRICS & GYNECOLOGY | Admitting: OBSTETRICS & GYNECOLOGY
Payer: MEDICAID

## 2018-04-02 DIAGNOSIS — Z37.1: ICD-10-CM

## 2018-04-02 DIAGNOSIS — O36.4XX0: Primary | ICD-10-CM

## 2018-04-02 DIAGNOSIS — O42.112: ICD-10-CM

## 2018-04-02 DIAGNOSIS — O26.92: ICD-10-CM

## 2018-04-02 DIAGNOSIS — Z67.41: ICD-10-CM

## 2018-04-02 DIAGNOSIS — D62: ICD-10-CM

## 2018-04-02 DIAGNOSIS — Z3A.22: ICD-10-CM

## 2018-04-02 LAB
ADD MANUAL DIFF: NO
ALBUMIN SERPL-MCNC: 3.9 G/DL (ref 3.5–5)
ALP SERPL-CCNC: 68 U/L (ref 38–126)
ALT SERPL-CCNC: 12 U/L (ref 9–52)
ANION GAP SERPL CALC-SCNC: 9 MMOL/L (ref 5–19)
APPEARANCE UR: (no result)
APTT BLD: 27.4 SEC (ref 23.5–35.8)
APTT PPP: YELLOW S
AST SERPL-CCNC: 19 U/L (ref 14–36)
BARBITURATES UR QL SCN: NEGATIVE
BASOPHILS # BLD AUTO: 0 10^3/UL (ref 0–0.2)
BASOPHILS NFR BLD AUTO: 0.2 % (ref 0–2)
BILIRUB DIRECT SERPL-MCNC: 0.2 MG/DL (ref 0–0.4)
BILIRUB SERPL-MCNC: 0.7 MG/DL (ref 0.2–1.3)
BILIRUB UR QL STRIP: NEGATIVE
BUN SERPL-MCNC: 4 MG/DL (ref 7–20)
CALCIUM: 10.2 MG/DL (ref 8.4–10.2)
CHLORIDE SERPL-SCNC: 100 MMOL/L (ref 98–107)
CO2 SERPL-SCNC: 25 MMOL/L (ref 22–30)
EOSINOPHIL # BLD AUTO: 0 10^3/UL (ref 0–0.6)
EOSINOPHIL NFR BLD AUTO: 0.2 % (ref 0–6)
ERYTHROCYTE [DISTWIDTH] IN BLOOD BY AUTOMATED COUNT: 13.7 % (ref 11.5–14)
FIBRINOGEN PPP-MCNC: 431 MG/DL (ref 209–497)
GLUCOSE SERPL-MCNC: 84 MG/DL (ref 75–110)
GLUCOSE UR STRIP-MCNC: NEGATIVE MG/DL
HCT VFR BLD CALC: 34.3 % (ref 36–47)
HGB BLD-MCNC: 11.5 G/DL (ref 12–15.5)
INR PPP: 0.93
KETONES UR STRIP-MCNC: 20 MG/DL
LYMPHOCYTES # BLD AUTO: 0.9 10^3/UL (ref 0.5–4.7)
LYMPHOCYTES NFR BLD AUTO: 9 % (ref 13–45)
MCH RBC QN AUTO: 29.4 PG (ref 27–33.4)
MCHC RBC AUTO-ENTMCNC: 33.6 G/DL (ref 32–36)
MCV RBC AUTO: 88 FL (ref 80–97)
METHADONE UR QL SCN: NEGATIVE
MONOCYTES # BLD AUTO: 0.4 10^3/UL (ref 0.1–1.4)
MONOCYTES NFR BLD AUTO: 4.2 % (ref 3–13)
NEUTROPHILS # BLD AUTO: 8.3 10^3/UL (ref 1.7–8.2)
NEUTS SEG NFR BLD AUTO: 86.4 % (ref 42–78)
NITRITE UR QL STRIP: NEGATIVE
PCP UR QL SCN: NEGATIVE
PH UR STRIP: 9 [PH] (ref 5–9)
PLATELET # BLD: 200 10^3/UL (ref 150–450)
POTASSIUM SERPL-SCNC: 3.8 MMOL/L (ref 3.6–5)
PROT SERPL-MCNC: 6.5 G/DL (ref 6.3–8.2)
PROT UR STRIP-MCNC: NEGATIVE MG/DL
PROTHROMBIN TIME: 13.1 SEC (ref 11.4–15.4)
RBC # BLD AUTO: 3.92 10^6/UL (ref 3.72–5.28)
SODIUM SERPL-SCNC: 133.5 MMOL/L (ref 137–145)
SP GR UR STRIP: 1
TOTAL CELLS COUNTED % (AUTO): 100 %
URINE AMPHETAMINES SCREEN: NEGATIVE
URINE BENZODIAZEPINES SCREEN: NEGATIVE
URINE COCAINE SCREEN: NEGATIVE
URINE MARIJUANA (THC) SCREEN: NEGATIVE
UROBILINOGEN UR-MCNC: NEGATIVE MG/DL (ref ?–2)
WBC # BLD AUTO: 9.6 10^3/UL (ref 4–10.5)

## 2018-04-02 PROCEDURE — 86592 SYPHILIS TEST NON-TREP QUAL: CPT

## 2018-04-02 PROCEDURE — 76815 OB US LIMITED FETUS(S): CPT

## 2018-04-02 PROCEDURE — 85610 PROTHROMBIN TIME: CPT

## 2018-04-02 PROCEDURE — 85461 HEMOGLOBIN FETAL: CPT

## 2018-04-02 PROCEDURE — 86920 COMPATIBILITY TEST SPIN: CPT

## 2018-04-02 PROCEDURE — 80053 COMPREHEN METABOLIC PANEL: CPT

## 2018-04-02 PROCEDURE — 86901 BLOOD TYPING SEROLOGIC RH(D): CPT

## 2018-04-02 PROCEDURE — 36415 COLL VENOUS BLD VENIPUNCTURE: CPT

## 2018-04-02 PROCEDURE — 85362 FIBRIN DEGRADATION PRODUCTS: CPT

## 2018-04-02 PROCEDURE — 86850 RBC ANTIBODY SCREEN: CPT

## 2018-04-02 PROCEDURE — 86900 BLOOD TYPING SEROLOGIC ABO: CPT

## 2018-04-02 PROCEDURE — 85730 THROMBOPLASTIN TIME PARTIAL: CPT

## 2018-04-02 PROCEDURE — 85027 COMPLETE CBC AUTOMATED: CPT

## 2018-04-02 PROCEDURE — 81001 URINALYSIS AUTO W/SCOPE: CPT

## 2018-04-02 PROCEDURE — 85025 COMPLETE CBC W/AUTO DIFF WBC: CPT

## 2018-04-02 PROCEDURE — 80307 DRUG TEST PRSMV CHEM ANLYZR: CPT

## 2018-04-02 PROCEDURE — 88305 TISSUE EXAM BY PATHOLOGIST: CPT

## 2018-04-02 PROCEDURE — 85384 FIBRINOGEN ACTIVITY: CPT

## 2018-04-02 RX ADMIN — HYDROMORPHONE HYDROCHLORIDE PRN MG: 2 INJECTION INTRAMUSCULAR; INTRAVENOUS; SUBCUTANEOUS at 15:20

## 2018-04-02 RX ADMIN — HYDROMORPHONE HYDROCHLORIDE PRN MG: 2 INJECTION INTRAMUSCULAR; INTRAVENOUS; SUBCUTANEOUS at 17:52

## 2018-04-02 NOTE — ADMISSION PHYSICAL
=================================================================



=================================================================

Datetime Report Generated by CPN: 2018 13:56

   

   

=================================================================

CURRENT ADMISSION

=================================================================

   

Prenatal Hx Assessment:  The Prenatal History has been Reviewed and is

   Current

Chief Complaint:  Uterine Contractions

Indication for Induction:  Not Applicable

Admit Impression :  , Intrauterine Pregnancy; Active Labor;

   Ruptured Membranes

Admit Plan:  Admit to Unit

   

=================================================================

ALLERGIES

=================================================================

   

Medication Allergies:  No

Medication Allergies:  Latex, Natural Rubber (2018)

Latex:  Latex Allergies

Food Allergies:  N/A

   

=================================================================

OBSTETRICAL HISTORY

=================================================================

   

EDC:  2018 00:00

:  4

Para:  2

:  1

Living:  3

Gestational Diabetes:  No

Rh Sensitization:  No

Incompetent Cervix:  No

WILBERTO:  No

Infertility:  No

ART Treatment:  No

Uterine Anomaly:  No

IUGR:  No

Hx Previous C/S:  No

Macrosomia:  No

Hx Loss/Stillborn:  No

PIH:  No

Hx  Death:  No

Placenta Previa/Abruption:  No

Depression/PP Depression:  No

PTL/PROM:  No

Post Partum Hemorrhage:  No

Current Pregnancy Procedures:  Ultrasound

Obstetrical History Comments:  G1 - , , boy 41 weeks

   G2 - 2006, , boy 40 weeks

   G3 - 2011, , boy, 36 weeks

   G4 - current pregnancy

   

=================================================================

***SEE PRENATAL RECORDS***

=================================================================

   

Alcohol:  No

Marijuana :  No

Cocaine:  No

Other Illicit Drugs:  No

Cigarettes:  Never Smoker. [id number]

   

=================================================================

MEDICAL HISTORY

=================================================================

   

Diabetes:  No

Blood Transfusion:  No

Pulmonary Disease (Asthma, TB):  No

Breast Disease:  No

Hypertension:  No

Gyn Surgery:  No

Heart Disease:  No

Hosp/Surgery:  No

Autoimmune Disorder:  No

Anesthetic Complications:  No

Kidney Disease:  No

Abnormal Pap Smear:  Yes

Neuro/Epilepsy:  No

Psychiatric Disorders:  No

Other Medical Diseases:  No

Hepatitis/Liver Disease:  No

Significant Family History:  No

Varicosities/Phlebitis:  No

Trauma/Violence :  No

Thyroid Dysfunction:  No

Medical History Comments:  Colpo, Chronic cervicitis, Migraines

   

=================================================================

INFECTIOUS HISTORY

=================================================================

   

Gonorrhea:  No

Genital Herpes:  No

Chlamydia:  No

Tuberculosis:  No

Syphilis:  No

Hepatitis:  No

HIV/AIDS Exposure:  No

Rash or Viral Illness:  No

HPV:  No

   

=================================================================

PHYSICAL EXAM

=================================================================

   

General:  Normal

HEENT:  Normal

Neurologic:  Normal

Thyroid:  Deferred

Heart:  Normal

Lungs:  Normal

Breast:  Normal

Back:  Normal

Abdomen:  Normal

Genitourinary Exam:  Normal

Extremities:  Normal

DTRs:  Normal

Pelvic Type:  Adequate

Vital Signs:  Reviewed

   

=================================================================

VAGINAL EXAM

=================================================================

   

Dilatation:  5

Effacement:  70

Contraction Comments:  3-7

   

=================================================================

FETUS A

=================================================================

   

EGA:  22.3

Fetal Presentation:  Breech

Admit Comment:  Pt has been pprom since 18. pt started prenatal

   care on 18 and sent to [facility]. Pt aware that prognosis is poor. Pt

   was hospitalized and given pain control, pain got unbareable last

   night and pt came to the hospital this morning, states abdominal

   tenderness, ctx every few minutes. continues to leak clear fluid. 

   Pt is RH negative. 

   SVE as above, appears delivery is immenent

   Will admit for pain control

   Dr. Castro to bedside to discuss options witht pt

   Dr. Vasquez updated, Dr. Patricia to bedside discussed with pt plan of

   care

   Labs ordered

   Abx orders zosyn q. 8 hrs

   Will order formal bedside sono 

   

=================================================================

PLANS FOR LABOR AND DELIVERY

=================================================================

   

Pain Management:  Medications; Epidural

   

=================================================================

INFORMED CONSENT

=================================================================

   

Informed Consent Obtained:  Risks, Benefits and Alternatives Discussed

Assignment:  Lorna Patricia MD

Signature:  Electronically signed by Diana Joel CNM on 2018 at

   13:55  with User ID: [email or web address], Addendum/Amendment: dr. darwin jenkins into see pt

:  Electronically signed by Diana Joel CNM on 2018 at 13:55  with

   User ID: [email or web address] Addendum/Amendment: dr. darwin jenkins

   into see pt

## 2018-04-02 NOTE — RADIOLOGY REPORT (SQ)
EXAM DESCRIPTION:  U/S OB LIMITED



COMPLETED DATE/TIME:  4/2/2018 2:47 pm



REASON FOR STUDY:  placental location, heart tones



COMPARISON:  OB ultrasound 3/22/2018, 2/6/2018



TECHNIQUE:  Limited transabdominal grayscale ultrasound for evaluation of specific requested obstetri
susana parameters.



LIMITATIONS:  None.



FINDINGS:  CERVICAL LENGTH: There is funneling of the cervix, breech fetus is protruding into the cer
vix which is effaced.

KASSIDY: Absent amniotic fluid around the fetus

FHR: 125 through 137 beats per minute.

PRESENTATION: Breech

OTHER: Fundal placenta



IMPRESSION:  Effacement of the cervix, breech orientation fetus

Absent amniotic fluid around the fetus

Fetal heart rate 125 to 137 beats per minute

Trimester of pregnancy: Second trimester - 13 weeks 1 day to 27 weeks 6 days.



COMMENT:  This report was called by Kizzy the ultrasound tech to Diana Joel CNM at the completion of
 the exam



TECHNICAL DOCUMENTATION:  JOB ID:  [id number]

 2011 [employer]- All Rights Reserved



Reading location - IP/workstation name: [email or web address]

## 2018-04-02 NOTE — L&D PROGRESS NOTES
=================================================================

PROGRESS NOTES

=================================================================

Datetime Report Generated by CPN: 04/02/2018 17:27

   

   

=================================================================

PROGRESS NOTE

=================================================================

   

Comment:  Pt coping well, pt verbalizes understanding in regards to

   imminent delivery, and baby's premature non-viable as Dr. Gloria has

   spoken with pt in regards pts poor prognosis.

      

   On exam infant partially delivered buttox up to mid thorax, cytotec

   given to help inevitable delivery. 

      

   

=================================================================

FETUS C

=================================================================

   

SIGNATURE:  [id number]

Assignment:  Lorna Patricia MD

Signature:  Electronically signed by Diana Joel CNM on 4/2/2018 at

   17:26  with User ID: [email or web address]

:  Electronically signed by Diana Joel CNM on 4/2/2018 at 17:26  with

   User ID: [email or web address]

## 2018-04-02 NOTE — L&D PROGRESS NOTES
=================================================================

PROGRESS NOTES

=================================================================

Datetime Report Generated by CPN: 04/02/2018 16:29

   

   

=================================================================

PROGRESS NOTE

=================================================================

   

Procedures:  Sterile Vag Exam

Vital Signs :  Reviewed

Comment:  pt with baby in the vagina

   discussed with pt expectant mgmt vs. cytotec vs. pitocin

   discussed risks of bleeding and infection with prolonging things

   pt agreeable to cytotec orally

   

=================================================================

VAGINAL EXAM

=================================================================

   

Dilatation:  5

Dilatation:  5

Effacement:  100

Effacement:  70

Contractions:  3-7

   

=================================================================

FETUS A

=================================================================

   

FHR - Baseline:  120

Fetal Presentation:  Breech

   

=================================================================

SIGNATURE

=================================================================

   

SIGNATURE:  [id number]

SIGNATURE:  [id number]

Assignment:  Lorna Patricia MD

Signature:  Electronically signed by Diana Joel CNM on 4/2/2018 at

   16:29  with User ID: [email or web address]

:  Electronically signed by Diana Joel CNM on 4/2/2018 at 16:29  with

   User ID: [email or web address]

## 2018-04-03 VITALS — DIASTOLIC BLOOD PRESSURE: 53 MMHG | SYSTOLIC BLOOD PRESSURE: 100 MMHG

## 2018-04-03 LAB
ERYTHROCYTE [DISTWIDTH] IN BLOOD BY AUTOMATED COUNT: 13.6 % (ref 11.5–14)
HCT VFR BLD CALC: 25.6 % (ref 36–47)
HGB BLD-MCNC: 8.6 G/DL (ref 12–15.5)
MCH RBC QN AUTO: 29.6 PG (ref 27–33.4)
MCHC RBC AUTO-ENTMCNC: 33.5 G/DL (ref 32–36)
MCV RBC AUTO: 89 FL (ref 80–97)
PLATELET # BLD: 183 10^3/UL (ref 150–450)
RBC # BLD AUTO: 2.9 10^6/UL (ref 3.72–5.28)
WBC # BLD AUTO: 9.6 10^3/UL (ref 4–10.5)

## 2018-04-03 PROCEDURE — 3E0234Z INTRODUCTION OF SERUM, TOXOID AND VACCINE INTO MUSCLE, PERCUTANEOUS APPROACH: ICD-10-PCS | Performed by: OBSTETRICS & GYNECOLOGY

## 2018-04-03 RX ADMIN — ACETAMINOPHEN AND CODEINE PHOSPHATE PRN EACH: 300; 30 TABLET ORAL at 07:59

## 2018-04-03 RX ADMIN — IBUPROFEN SCH MG: 800 TABLET, FILM COATED ORAL at 08:19

## 2018-04-03 RX ADMIN — IBUPROFEN SCH MG: 800 TABLET, FILM COATED ORAL at 14:40

## 2018-04-03 RX ADMIN — ACETAMINOPHEN AND CODEINE PHOSPHATE PRN EACH: 300; 30 TABLET ORAL at 12:36

## 2018-04-03 NOTE — PDOC DISCHARGE SUMMARY
Final Diagnosis


Discharge Date: 18





- Final Diagnosis


(1)  delivery


Is this a current diagnosis for this admission?: Yes   





(2) Fetal demise > 22 weeks, delivered, current hospitalization


Is this a current diagnosis for this admission?: Yes   





(3) Acute blood loss anemia


Is this a current diagnosis for this admission?: Yes   





Discharge Data





- Discharge Medication


Prescriptions: 


Acetaminophen with Codeine [Tylenol #3 Tablet] 1 each PO Q4HP PRN #20 tablet


 PRN Reason: Abdominal Cramping


Ibuprofen [Motrin 800 mg Tablet] 800 mg PO Q8HP PRN #60 tablet


 PRN Reason: Abdominal Cramping


Docusate Sodium [Colace 100 mg Capsule] 100 mg PO BID #60 capsule


Ferrous Sulfate [Feosol 325 mg Tablet] 325 mg PO BID #60 tablet


Home Medications: 








Pediatric Multivitamin No.49 [Flintstones Gummies] 2 each PO DAILY 18 


Acetaminophen with Codeine [Tylenol #3 Tablet] 1 each PO Q4HP PRN #20 tablet  


Docusate Sodium [Colace 100 mg Capsule] 100 mg PO BID #60 capsule 18 


Ferrous Sulfate [Feosol 325 mg Tablet] 325 mg PO BID #60 tablet 18 


Ibuprofen [Motrin 800 mg Tablet] 800 mg PO Q8HP PRN #60 tablet 18 








Reason(s) for Admission: Onset of Labor, PROM,  Labor


Prenatal Procedures: Ultrasound


Intrapartum Procedure(s): Spontaneous Vaginal Delivery - footling breech, Other 

- IUFD





- Diagnosis Test


Laboratory: 


 











Temp Pulse Resp BP Pulse Ox


 


 98.5 F   94   16   97/52 L  99 


 


 18 09:41  18 09:41  18 09:41  18 09:41  18 09:41








 











  18





  12:14 13:26 07:56


 


RBC   3.92  2.90 L


 


Hgb   11.5 L  8.6 L D


 


Hct   34.3 L  25.6 L


 


Urine Opiates Screen  NEGATIVE  














- Discharge information/Instructions


Discharge Activity: Activity As Tolerated, Balance Activity w/Rest, No Lifting 

Over 10 Pounds, Pelvic Rest, No tub bath, Walk Frequently


Discharge Diet: As Tolerated, Regular


Disposition: HOME, SELF-CARE


Follow up with: [facility]


in: 3, Days

## 2018-04-03 NOTE — DELIVERY SUMMARY
=================================================================

Del Sum A-C

=================================================================

Datetime Report Generated by CPN: 2018 08:40

   

   

=================================================================

DELIVERY PERSONNEL

=================================================================

   

DELIVERY PERSONNEL:  [id number]

Delivery Doctor::  Betito Vasquez MD

Labor and Delivery Nurse::  Alise Modi RN

Labor and Delivery Nurse::  Alyssa Garcia RN

   

=================================================================

MATERNAL INFORMATION

=================================================================

   

Delivery Anesthesia:  None

Medications After Delivery:  Pitocin Drip 20 Units/1000ml NSS

Estimated  Blood Loss (ml):  300

Maternal Complications:  Premature Rupture of Membranes

Provider Comments:  Normal spontaneous vaginal delivery of a nonviable

   female infant, Apgars 0/0, weighing 410 grams over an intact

   perineum. Placenta delivered spontaneous comlete.

   

=================================================================

LABOR SUMMARY

=================================================================

   

EDC:  2018 00:00

No. Babies in Womb:  1

 Attempted:  No

Labor Anesthesia:  IV Sedation

   

=================================================================

LABOR INFORMATION

=================================================================

   

Reason for Induction:  Premature Rupture of Membranes; Oligohydramnios;

   Fetal Anomalies

Reason for Induction- Other:  PPROM since 14 wks (18)

Onset of Labor:  2018 11:59

Complete Dilatation:  2018 20:56

Cervical Ripening Agents:  Cytotec @

Oxytocin:  N/A

Group B Beta Strep:  Unknown

Steroids Given:  None

Reason Steroids Not Administered:  Not Applicable

   

=================================================================

MEMBRANES

=================================================================

   

Membranes Rupture Method:  Spontaneous

Rupture of Membranes:  2018 08:00

Length of Rupture (hr):  1332.25

Amniotic Fluid Color:  Clear

Amniotic Fluid Amount:  Moderate

Amniotic Fluid Odor:  Normal

   

=================================================================

STAGES OF LABOR

=================================================================

   

Stage 1 hr:  8

Stage 1 min:  57

Stage 2 hr:  0

Stage 2 min:  19

Stage 3 hr:  1

Stage 3 min:  15

Total Time in Labor hr:  10

Total Time in Labor min:  31

   

=================================================================

VAGINAL DELIVERY

=================================================================

   

Episiotomy:  None

Laceration #1:  None

Laceration Extension #1:  N/A

Laceration Repair:  Not Applicable

Sponge Count Correct:  N/A

Sharps Count Correct:  N/A

   

=================================================================

CSECTION DELIVERY

=================================================================

   

Primary Indication:  N/A

Secondary Indication:  N/A

CSection Incidence:  N/A

Labor:  N/A

Elective:  N/A

CSection Incision:  N/A

   

=================================================================

BABY A INFORMATION

=================================================================

   

Infant Delivery Date/Time:  2018 21:15

Method of Delivery:  Vaginal

Born in Route :  No

:  N/A

Forceps:  N/A

Vacuum Extraction:  N/A

Shoulder Dystocia :  No

   

=================================================================

PRESENTATION/POSITION BABY A

=================================================================

   

Presentation:  Breech

Cephalic Presentation:  N/A

Breech Presentation:  Double Footling

   

=================================================================

PLACENTA INFORMATION BABY A

=================================================================

   

Placenta Delivery Time :  2018 22:30

Placenta Method of Delivery:  Manual Removal

Placenta Status:  Delivered

   

=================================================================

APGAR SCORES BABY A

=================================================================

   

Heart Rate 1 min:  Absent

Resp Effort 1 min:  Absent

Reflex Irritability 1 min:  No Response

Muscle Tone 1 min:  Flaccid

Color 1 min:  Blue/Pale

APGAR SCORE 1 MIN:  0

Heart Rate 5 min:  Absent

Resp Effort 5 min:  Absent

Reflex Irritability 5 min:  No Response

Muscle Tone 5 min:  Flaccid

Color 5 min:  Blue/Pale

APGAR SCORE 5 MIN:  0

   

=================================================================

INFANT INFORMATION BABY A

=================================================================

   

Gestational Age at Delivery:  22.3

Gestational Status:  - <34 Weeks

Infant Outcome :  Stillborn

Infant Sex:  Female

   

=================================================================

WEIGHT/LENGTH BABY A

=================================================================

   

Infant Birthweight (gm):  410

Infant Weight (lb):  0

Infant Weight (oz):  14

Infant Length (in):  10.25

Infant Length (cm):  26.04

   

=================================================================

CORD INFORMATION BABY A

=================================================================

   

Nuchal Cord :  N/A

   

=================================================================

BABY B INFORMATION

=================================================================

   

 :  N/A

   

=================================================================

SIGNATURES

=================================================================

   

Signature:  Electronically signed by Betito Vasquez MD ([facility]) on

   2018 at 22:39  with User ID: [email or web address]

:  I was personally available for consultation and serving as

   supervising physician for the MLP.

:  I personally evaluated and examined the patient in conjunction with

   the MLP and agree with the assessment, treatment plan and

   disposition.

## 2018-04-20 ENCOUNTER — HOSPITAL ENCOUNTER (EMERGENCY)
Dept: HOSPITAL 62 - ER | Age: 33
Discharge: HOME | End: 2018-04-20
Payer: MEDICAID

## 2018-04-20 VITALS — SYSTOLIC BLOOD PRESSURE: 108 MMHG | DIASTOLIC BLOOD PRESSURE: 56 MMHG

## 2018-04-20 DIAGNOSIS — Z63.4: ICD-10-CM

## 2018-04-20 DIAGNOSIS — F32.9: Primary | ICD-10-CM

## 2018-04-20 DIAGNOSIS — Z91.040: ICD-10-CM

## 2018-04-20 LAB
ADD MANUAL DIFF: NO
ALBUMIN SERPL-MCNC: 4.7 G/DL (ref 3.5–5)
ALP SERPL-CCNC: 75 U/L (ref 38–126)
ALT SERPL-CCNC: 24 U/L (ref 9–52)
ANION GAP SERPL CALC-SCNC: 11 MMOL/L (ref 5–19)
APAP SERPL-MCNC: < 10 UG/ML (ref 10–30)
APPEARANCE UR: CLEAR
APTT PPP: (no result) S
AST SERPL-CCNC: 28 U/L (ref 14–36)
BARBITURATES UR QL SCN: NEGATIVE
BASOPHILS # BLD AUTO: 0 10^3/UL (ref 0–0.2)
BASOPHILS NFR BLD AUTO: 0.9 % (ref 0–2)
BILIRUB DIRECT SERPL-MCNC: 0.3 MG/DL (ref 0–0.4)
BILIRUB SERPL-MCNC: 0.9 MG/DL (ref 0.2–1.3)
BILIRUB UR QL STRIP: NEGATIVE
BUN SERPL-MCNC: 13 MG/DL (ref 7–20)
CALCIUM: 9.9 MG/DL (ref 8.4–10.2)
CHLORIDE SERPL-SCNC: 104 MMOL/L (ref 98–107)
CO2 SERPL-SCNC: 26 MMOL/L (ref 22–30)
EOSINOPHIL # BLD AUTO: 0.1 10^3/UL (ref 0–0.6)
EOSINOPHIL NFR BLD AUTO: 4.8 % (ref 0–6)
ERYTHROCYTE [DISTWIDTH] IN BLOOD BY AUTOMATED COUNT: 13.6 % (ref 11.5–14)
ETHANOL SERPL-MCNC: < 10 MG/DL
GLUCOSE SERPL-MCNC: 105 MG/DL (ref 75–110)
GLUCOSE UR STRIP-MCNC: NEGATIVE MG/DL
HCT VFR BLD CALC: 33.3 % (ref 36–47)
HGB BLD-MCNC: 10.9 G/DL (ref 12–15.5)
KETONES UR STRIP-MCNC: NEGATIVE MG/DL
LYMPHOCYTES # BLD AUTO: 1.1 10^3/UL (ref 0.5–4.7)
LYMPHOCYTES NFR BLD AUTO: 36.7 % (ref 13–45)
MCH RBC QN AUTO: 29.3 PG (ref 27–33.4)
MCHC RBC AUTO-ENTMCNC: 32.8 G/DL (ref 32–36)
MCV RBC AUTO: 89 FL (ref 80–97)
METHADONE UR QL SCN: NEGATIVE
MONOCYTES # BLD AUTO: 0.2 10^3/UL (ref 0.1–1.4)
MONOCYTES NFR BLD AUTO: 7.6 % (ref 3–13)
NEUTROPHILS # BLD AUTO: 1.5 10^3/UL (ref 1.7–8.2)
NEUTS SEG NFR BLD AUTO: 50 % (ref 42–78)
NITRITE UR QL STRIP: NEGATIVE
PCP UR QL SCN: NEGATIVE
PH UR STRIP: 5 [PH] (ref 5–9)
PLATELET # BLD: 309 10^3/UL (ref 150–450)
POTASSIUM SERPL-SCNC: 4.2 MMOL/L (ref 3.6–5)
PROT SERPL-MCNC: 7.3 G/DL (ref 6.3–8.2)
PROT UR STRIP-MCNC: NEGATIVE MG/DL
RBC # BLD AUTO: 3.73 10^6/UL (ref 3.72–5.28)
SALICYLATES SERPL-MCNC: < 1 MG/DL (ref 2–20)
SODIUM SERPL-SCNC: 141.3 MMOL/L (ref 137–145)
SP GR UR STRIP: 1
TOTAL CELLS COUNTED % (AUTO): 100 %
URINE AMPHETAMINES SCREEN: NEGATIVE
URINE BENZODIAZEPINES SCREEN: NEGATIVE
URINE COCAINE SCREEN: NEGATIVE
URINE MARIJUANA (THC) SCREEN: NEGATIVE
UROBILINOGEN UR-MCNC: NEGATIVE MG/DL (ref ?–2)
WBC # BLD AUTO: 3.1 10^3/UL (ref 4–10.5)

## 2018-04-20 PROCEDURE — 93005 ELECTROCARDIOGRAM TRACING: CPT

## 2018-04-20 PROCEDURE — 81001 URINALYSIS AUTO W/SCOPE: CPT

## 2018-04-20 PROCEDURE — 36415 COLL VENOUS BLD VENIPUNCTURE: CPT

## 2018-04-20 PROCEDURE — 80053 COMPREHEN METABOLIC PANEL: CPT

## 2018-04-20 PROCEDURE — 80307 DRUG TEST PRSMV CHEM ANLYZR: CPT

## 2018-04-20 PROCEDURE — 93010 ELECTROCARDIOGRAM REPORT: CPT

## 2018-04-20 PROCEDURE — 85025 COMPLETE CBC W/AUTO DIFF WBC: CPT

## 2018-04-20 PROCEDURE — 99285 EMERGENCY DEPT VISIT HI MDM: CPT

## 2018-04-20 SDOH — SOCIAL STABILITY - SOCIAL INSECURITY: DISSAPEARANCE AND DEATH OF FAMILY MEMBER: Z63.4
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
## 2018-10-18 ENCOUNTER — HOSPITAL ENCOUNTER (EMERGENCY)
Dept: HOSPITAL 62 - ER | Age: 33
Discharge: HOME | End: 2018-10-18
Payer: COMMERCIAL

## 2018-10-18 VITALS — SYSTOLIC BLOOD PRESSURE: 94 MMHG | DIASTOLIC BLOOD PRESSURE: 43 MMHG

## 2018-10-18 DIAGNOSIS — R00.2: ICD-10-CM

## 2018-10-18 DIAGNOSIS — Z3A.11: ICD-10-CM

## 2018-10-18 DIAGNOSIS — O20.0: Primary | ICD-10-CM

## 2018-10-18 LAB
ADD MANUAL DIFF: NO
ALBUMIN SERPL-MCNC: 4.5 G/DL (ref 3.5–5)
ALP SERPL-CCNC: 42 U/L (ref 38–126)
ALT SERPL-CCNC: 16 U/L (ref 9–52)
ANION GAP SERPL CALC-SCNC: 11 MMOL/L (ref 5–19)
APPEARANCE UR: (no result)
APTT PPP: YELLOW S
AST SERPL-CCNC: 22 U/L (ref 14–36)
BASOPHILS # BLD AUTO: 0 10^3/UL (ref 0–0.2)
BASOPHILS NFR BLD AUTO: 0.5 % (ref 0–2)
BILIRUB DIRECT SERPL-MCNC: 0.2 MG/DL (ref 0–0.4)
BILIRUB SERPL-MCNC: 0.6 MG/DL (ref 0.2–1.3)
BILIRUB UR QL STRIP: NEGATIVE
BUN SERPL-MCNC: 10 MG/DL (ref 7–20)
CALCIUM: 9.7 MG/DL (ref 8.4–10.2)
CHLORIDE SERPL-SCNC: 103 MMOL/L (ref 98–107)
CO2 SERPL-SCNC: 23 MMOL/L (ref 22–30)
EOSINOPHIL # BLD AUTO: 0.1 10^3/UL (ref 0–0.6)
EOSINOPHIL NFR BLD AUTO: 1.5 % (ref 0–6)
ERYTHROCYTE [DISTWIDTH] IN BLOOD BY AUTOMATED COUNT: 18.2 % (ref 11.5–14)
GLUCOSE SERPL-MCNC: 75 MG/DL (ref 75–110)
GLUCOSE UR STRIP-MCNC: NEGATIVE MG/DL
HCT VFR BLD CALC: 35.3 % (ref 36–47)
HGB BLD-MCNC: 11.9 G/DL (ref 12–15.5)
KETONES UR STRIP-MCNC: (no result) MG/DL
LYMPHOCYTES # BLD AUTO: 1.5 10^3/UL (ref 0.5–4.7)
LYMPHOCYTES NFR BLD AUTO: 20.1 % (ref 13–45)
MCH RBC QN AUTO: 28.3 PG (ref 27–33.4)
MCHC RBC AUTO-ENTMCNC: 33.6 G/DL (ref 32–36)
MCV RBC AUTO: 84 FL (ref 80–97)
MONOCYTES # BLD AUTO: 0.4 10^3/UL (ref 0.1–1.4)
MONOCYTES NFR BLD AUTO: 5.7 % (ref 3–13)
NEUTROPHILS # BLD AUTO: 5.2 10^3/UL (ref 1.7–8.2)
NEUTS SEG NFR BLD AUTO: 72.2 % (ref 42–78)
NITRITE UR QL STRIP: NEGATIVE
PH UR STRIP: 5 [PH] (ref 5–9)
PLATELET # BLD: 188 10^3/UL (ref 150–450)
POTASSIUM SERPL-SCNC: 4 MMOL/L (ref 3.6–5)
PROT SERPL-MCNC: 7.6 G/DL (ref 6.3–8.2)
PROT UR STRIP-MCNC: NEGATIVE MG/DL
RBC # BLD AUTO: 4.19 10^6/UL (ref 3.72–5.28)
SODIUM SERPL-SCNC: 136.9 MMOL/L (ref 137–145)
SP GR UR STRIP: 1.03
TOTAL CELLS COUNTED % (AUTO): 100 %
UROBILINOGEN UR-MCNC: 2 MG/DL (ref ?–2)
WBC # BLD AUTO: 7.2 10^3/UL (ref 4–10.5)

## 2018-10-18 PROCEDURE — 86900 BLOOD TYPING SEROLOGIC ABO: CPT

## 2018-10-18 PROCEDURE — 85025 COMPLETE CBC W/AUTO DIFF WBC: CPT

## 2018-10-18 PROCEDURE — 84702 CHORIONIC GONADOTROPIN TEST: CPT

## 2018-10-18 PROCEDURE — 99285 EMERGENCY DEPT VISIT HI MDM: CPT

## 2018-10-18 PROCEDURE — 96360 HYDRATION IV INFUSION INIT: CPT

## 2018-10-18 PROCEDURE — 36415 COLL VENOUS BLD VENIPUNCTURE: CPT

## 2018-10-18 PROCEDURE — 86850 RBC ANTIBODY SCREEN: CPT

## 2018-10-18 PROCEDURE — 81001 URINALYSIS AUTO W/SCOPE: CPT

## 2018-10-18 PROCEDURE — 93010 ELECTROCARDIOGRAM REPORT: CPT

## 2018-10-18 PROCEDURE — 93976 VASCULAR STUDY: CPT

## 2018-10-18 PROCEDURE — S0119 ONDANSETRON 4 MG: HCPCS

## 2018-10-18 PROCEDURE — 80053 COMPREHEN METABOLIC PANEL: CPT

## 2018-10-18 PROCEDURE — 76801 OB US < 14 WKS SINGLE FETUS: CPT

## 2018-10-18 PROCEDURE — 96361 HYDRATE IV INFUSION ADD-ON: CPT

## 2018-10-18 PROCEDURE — 93005 ELECTROCARDIOGRAM TRACING: CPT

## 2018-10-18 PROCEDURE — 86901 BLOOD TYPING SEROLOGIC RH(D): CPT

## 2018-10-18 NOTE — ER DOCUMENT REPORT
ED Medical Screen (RME)





- General


Chief Complaint: Palpitations


Stated Complaint: HEART FLUTTERS


Time Seen by Provider: 10/18/18 16:03


Notes: 





33 years old female , presents with pregnancy, last menstrual cycle was in 

September, having dizziness headache lightheadedness, lower right abdominal pain

, vaginal discharge with brownish in color.  She is also nauseous


Denies any fever chills dysuria or frequency.





Right lower quadrant tenderness noted


TRAVEL OUTSIDE OF THE [place] IN LAST 30 DAYS: No





- Related Data


Allergies/Adverse Reactions: 


 





Latex, Natural Rubber Adverse Reaction (Verified 10/18/18 15:29)


 











Past Medical History





- Social History


Frequency of alcohol use: None


Neurological Medical History: Reports: Hx Migraine


Renal/ Medical History: Denies: Hx Peritoneal Dialysis


Past Surgical History: Reports: Hx Abdominal Surgery - hernia, Hx Herniorrhaphy





- Immunizations


Hx Diphtheria, Pertussis, Tetanus Vaccination: Yes


History of Influenza Vaccine for 10/2017 - 3/2018 Season: Refused





Physical Exam





- Vital signs


Vitals: 





 











Temp Pulse Resp BP Pulse Ox


 


 98.7 F   80   18   110/41 L  100 


 


 10/18/18 15:44  10/18/18 15:44  10/18/18 15:44  10/18/18 15:44  10/18/18 15:44














Course





- Vital Signs


Vital signs: 





 











Temp Pulse Resp BP Pulse Ox


 


 98.7 F   80   18   110/41 L  100 


 


 10/18/18 15:44  10/18/18 15:44  10/18/18 15:44  10/18/18 15:44  10/18/18 15:44














Doctor's Discharge





- Discharge


Referrals: 


[facility] [Primary Care Provider] - Follow up as needed

## 2018-10-18 NOTE — RADIOLOGY REPORT (SQ)
EXAM DESCRIPTION:  U/S 1TRIMESTER/1GEST W/DOPPLER



COMPLETED DATE/TIME:  10/18/2018 7:53 pm



REASON FOR STUDY:  Abdominal pain/pregnancy/transvaginal US



COMPARISON:  None.



TECHNIQUE:  Transabdominal static and realtime grayscale images acquired of the pelvis. Additional se
lected spectral and color Doppler images recorded. All images stored on PACs.

bHCG:  Not applicable.

CLINICAL DATES:  Not given.



LIMITATIONS:  None.



FINDINGS:  FETUS:  Single Living intrauterine pregnancy.

ULTRASOUND EGA: 11 weeks 4 days

ULTRASOUND LEAH: 5/5/2019

EFW: Not applicable less than 20 weeks.

CRL:  4.8 cm.

FHR: 160  beats per minute.

FETAL SURVEY: Too early to assess.

AMNIOTIC FLUID: Adequate amount.

PLACENTA: Posterior

SUBCHORIONIC BLEED: No

SIZE OF BLEED: Not applicable.

UTERUS: No masses. No anomalies.

CERVICAL LENGTH: 4.6 cm.   Closed.

RIGHT ADNEXA: Normal ovary with normal vascular flow.  2.1 x 1.5 x 1.5 cm.  There is an 8 x 10 x 7 mm
 dominant follicle.

No adnexal free fluid.

No adnexal masses.

LEFT ADNEXA: Ovary not seen.

No adnexal free fluid.

No adnexal masses.

FREE FLUID: None.

OTHER: No other significant finding.



IMPRESSION:  LIVING INTRAUTERINE PREGNANCY.

EGA 11 weeks 4 days.

Trimester of pregnancy:  First - 0 to 13 weeks.



TECHNICAL DOCUMENTATION:  JOB ID:  [id number]

 2011 [employer]- All Rights Reserved                            rev-5/18



Reading location - IP/workstation name: [email or web address]

## 2018-10-18 NOTE — ER DOCUMENT REPORT
ED General





- General


Chief Complaint: Palpitations


Stated Complaint: HEART FLUTTERS


Time Seen by Provider: 10/18/18 16:03


Notes: 





Patient is pregnant, LMP July or August, G5, P3, A1.  She had a Mirena and but 

had it removed about a year ago before she got pregnant.  She is here 

complaining of pelvic cramping and passing some small clots of blood and 

brownish looking discharge Friday.  Her symptoms have subsided since then.  

Patient is also experiencing a fluttering feeling in her heart like "butterflies

" for the past couple of weeks.  These episodes occur very quickly and only 

lasts a few seconds and then subside.  She has not actually had any chest 

pains.  She has had what she felt were gas pains in her abdomen and she is also 

had episodes of seeing spots before her eyes, and dizziness.  Once again, these 

episodes only last a few seconds and then subside.  She had them previously a 

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  They went on for a 

couple of months and then subsided.  She is felt a little bit nauseated but not 

vomiting.  She has had a headache since she arrived here in the emergency 

department this evening.  She used to get migraine headaches, but does not get 

them anymore.





Says that she has lost 23 pounds in the past month. 





Patient got a flu vaccination last week, but that was after she started having 

these fluttering episodes.


TRAVEL OUTSIDE OF THE [place] IN LAST 30 DAYS: No





- Related Data


Allergies/Adverse Reactions: 


 





Latex, Natural Rubber Adverse Reaction (Verified 10/18/18 15:29)


 











Past Medical History





- Social History


Smoking Status: Never Smoker


Frequency of alcohol use: None


Family History: Reviewed & Not Pertinent


Patient has suicidal ideation: No


Patient has homicidal ideation: No





- Past Medical History


Cardiac Medical History: 


   Denies: Hx Coronary Artery Disease, Hx DVT, Hx Pulmonary Embolism


Neurological Medical History: Reports: Hx Migraine


Past Surgical History: Reports: Hx Abdominal Surgery - hernia, Hx Herniorrhaphy





- Immunizations


Hx Diphtheria, Pertussis, Tetanus Vaccination: Yes





Review of Systems





- Review of Systems


Notes: 





REVIEW OF SYSTEMS: Patient says that she has lost 23 pounds in the past month.





CONSTITUTIONAL :  Denies fever.


  


EENT:   Denies eye, ear, nose or mouth or throat pain or other symptoms.





CARDIOVASCULAR:  Denies chest pain.





RESPIRATORY:  Denies cough, chest congestion, but has been having shortness of 

breath with these described episodes.





GASTROINTESTINAL:  Denies abdominal pain.  No diarrhea.  Does have nausea and 

occasional vomiting which she attributes to the pregnancy.





GENITOURINARY:  Denies difficulty or painful urinating, urinary frequency, 

blood in urine.





MUSCULOSKELETAL:  Denies back or neck pain.  Denies joint pain or swelling.





SKIN:   Denies rash or skin lesions.





NEUROLOGICAL:  Denies LOC or altered mental status.  Headache started when she 

got here to the emergency department this evening..  Denies sensory loss or 

motor deficits.





ALL OTHER SYSTEMS REVIEWED AND NEGATIVE.





Physical Exam





- Vital signs


Vitals: 


 











Temp Pulse Resp BP Pulse Ox


 


 98.7 F   80   18   110/41 L  100 


 


 10/18/18 15:44  10/18/18 15:44  10/18/18 15:44  10/18/18 15:44  10/18/18 15:44











Interpretation: Normal.  No: Hypertensive, Tachycardic, Hypoxic, Tachypneic, 

Febrile





- Notes


Notes: 





PHYSICAL EXAMINATION:





GENERAL: Well-appearing, in no acute distress.





HEAD: Atraumatic, normocephalic.





EYES: Pupils equal round and reactive to light, extraocular movements intact.





ENT: oropharynx clear without exudates.  Moist mucous membranes.





NECK: Normal range of motion, supple.





LUNGS: Breath sounds clear and equal bilaterally.





HEART: Regular rate and rhythm without murmurs.





ABDOMEN: Soft, nontender.  No guarding or rebound.  No masses.  Pelvic exam 

deferred since patient is getting an ultrasound.





BACK:  No tenderness throughout entire back.





EXTREMITIES: Normal range of motion without pain.





NEUROLOGICAL:  Normal speech, normal gait.  Normal sensory, motor, and reflex 

exams.  Awake, alert, and oriented x3.  Cranial nerves normal.





PSYCH: Normal mood, normal affect.





SKIN: Warm, dry, no rashes.





Course





- Re-evaluation


Re-evalutation: 





10/19/18 11:30


Late entry: Reviewed patient's chart and found that she weighed 53.8 kg on 

April 20 of this year and weighs 56.5 kg on her visit here today.  These are 

the only documented weights that I could find recently.  At least in this 

timeframe, the patient has gained weight.  She does not appear to be somewhat 

has lost 23 pounds in the past month, as she stated.








- Vital Signs


Vital signs: 


 











Temp Pulse Resp BP Pulse Ox


 


 97.7 F   73   18   94/43 L  100 


 


 10/18/18 20:36  10/18/18 20:36  10/18/18 20:36  10/18/18 20:36  10/18/18 20:36











10/18/18 20:07


Patient is Rh- and will be getting RhoGam.





- Laboratory


Result Diagrams: 


 10/18/18 16:35





 10/18/18 16:20


Laboratory results interpreted by me: 


 











  10/18/18 10/18/18 10/18/18





  16:20 16:20 16:35


 


Hgb    11.9 L


 


Hct    35.3 L


 


RDW    18.2 H


 


Sodium   136.9 L 


 


Beta HCG, Quant   154449.00 H 


 


Urine Ketones  TRACE H  


 


Urine Urobilinogen  2.0 H  











10/18/18 20:08


Lab results were all noted and without significant findings except for the 

quantitative hCG which is elevated as expected.











- Diagnostic Test


Radiology reviewed: Image reviewed, Reports reviewed - Ultrasound showed an 11-

week, 4-day fetus with a heartbeat of 160.  No evidence of a source for the 

patient's bleeding symptoms.





- EKG Interpretation by Me


EKG shows normal: Sinus rhythm


Rate: Normal


Rhythm: NSR


Additional EKG results interpreted by me: 





10/18/18 19:24


EKG is normal.





Discharge





- Discharge


Clinical Impression: 


 Pregnancy, Threatened miscarriage in early pregnancy, Heart palpitations





Condition: Stable


Disposition: HOME, SELF-CARE


Additional Instructions: 


PREGNANCY:





     You are pregnant.  Prenatal care is best started as early in pregnancy as 

possible.


     If you're unsure about continuing this pregnancy, you should discuss this 

with your physician or with counsellors at [facility].


     You should take only medications approved by your physician. Acetaminophen 

can safely be taken for minor pains.  As a rule, medication for chronic 

conditions such as asthma or seizures can safely be continued.  You should 

discuss with the physician every medicine you take.


     Any regular exercise program can be continued.  Talk to your physician, 

however, before engaging in competitive or demanding sports.


     Alcohol, smoking, and "street drugs" are dangerous to your baby. Cocaine 

is especially dangerous.  Don't use any illicit drugs!








BLEEDING DURING EARLY PREGNANCY:





     You have been evaluated for passing blood while pregnant. While we take 

this symptom very seriously, most women with your degree of bleeding will go on 

to have a perfectly normal baby. At this time, there is no indication that a 

miscarriage will occur. (A miscarriage occurs when the fetus is abnormal.  

There is no medicine or treatment to prevent it.)


     A more serious cause of bleeding is tubal (or ectopic) pregnancy. An 

ultrasound usually can show whether the pregnancy is in the uterus or in the 

tube. Sometimes in early pregnancy, no fetus is seen. In this case, careful 

follow-up, including repeat blood tests and repeat ultrasound, is necessary.


     Do not douche or have sex for at least a week, or until OK'd by the 

doctor. Don't use tampons.


     Call the doctor or return for re-examination if there is an increase in 

bleeding or cramping, extreme weakness, fainting, new abdominal pain, fever, or 

passage of tissue.





Your ultrasound showed a living, viable fetus with a heart rate of 160, 

approximately 11 weeks and 4 days gestation.  There were no abnormalities noted 

of the ultrasound.








THREATENED MISCARRIAGE:





     You have been evaluated for a possible miscarriage.  At this time, there 

is no indication that a miscarriage will occur.  Most women with your symptoms 

will go on to have a perfectly normal baby.  However, careful observation will 

be necessary.


     A miscarriage occurs when the fetus is abnormal.  There is no medicine or 

treatment for it.


     You should rest in bed until the symptoms have resolved.  Do not douche or 

have sex for at least a week, or until OK'd by the doctor.


     Call the doctor or return for re-examination if there is an increase in 

bleeding or cramping, or passage of tissue.








RHOGAM:


   Rhogam is given to a pregnant woman who has Rh negative blood type when she 

has vaginal bleeding during her pregnancy or at the time of the delivery of her 

baby.  If a woman is Rh negative, her body will form antibodies against red 

blood cells from an Rh positive fetus or baby that mix with her blood during a 

threatened or actual miscarraige or delivery.  These antibodies will remain in 

the woman's body forever and will attack any future Rh positive fetus 

preventing it from developing into a normal baby.  Rhogam is given to prevent 

the mother's body from forming these antibodies.





Palpitations (Irregular/Rapid Heartrate)


     Irregular or rapid heartbeat is called "palpitation."  To diagnose the 

cause of palpitation, we have to "catch it in the act" with an EKG.


     Sinus Tachycardia:  This is a rapid (but NORMAL) rhythm that can be due to 

fever, pain, anxiety, lack of sleep, over-exertion, or drugs. Cold medications, 

caffeine, and diet pills are particularly likely to cause tachycardia.  Usually

, all that's required is rest, reassurance, and avoiding caffeine, alcohol, 

nicotine, and unnecessary medicines.


     Paroxysmal Atrial Tachycardia (PAT):  This abnormally rapid heartbeat is 

caused by a "short circuit" in the electrical system of the heart.  It is not 

dangerous, unless other heart disease is present. These attacks of PAT may 

occur occasionally for years.  Medication is available for treatment.


     Paroxysmal Atrial Fibrillation or Atrial Flutter:  This is irregular 

electrical activity in the upper heart chamber.  These abnormal rhythms often 

occur with valve disease or in hearts damaged by hardening of the arteries.  

These rhythms usually require further testing, for example a cardiac echo.


     Premature Beats:  Extra beats occur more commonly after caffeine, nicotine

, alcohol, cold pills, diet pills.  Emotional stress or fatigue also provoke 

them.  Extra beats are only dangerous when heart disease is present.  They 

usually need no treatment.  If they're frequent, or if evidence of heart 

disease develops, medication can be given to suppress them.


     If we were unable to "catch" the palpitations on EKG, you should try to 

get an EKG immediately if the symptoms begin again.  Contact the physician at 

once if you develop persistent lightheadedness, shortness of breath, chest pain

, or swelling of the ankles.








NORMAL EXAM AND WORKUP:


     At this time, your examination and workup show no significant abnormality.

  No significant abnormal physical findings were noted.  All laboratory, EKG, 

and imaging (x-ray, CT scans, ultrasound) studies that were ordered show no 

significant abnormality.


     Although your examination and all studies that were ordered showed no 

significant abnormal finding, there are no examinations and no studies that are 

100% accurate.  There is always the possibility that some abnormality could 

exist and not be detected with physical examination or within the limits and 

capabilities of laboratory and other studies.


     You should return or follow up as you were instructed on your visit today 

for further evaluation if your symptoms do not resolve.





Your weight loss remains unexplained.  Please follow-up with your primary care 

physician to look further into this problem.








Antinausea Medication





     You have been given a medication to suppress nausea and vomiting. This 

type of medication can be given as a shot, pill, or suppository. It will 

usually last for many hours.  Pills and shots usually last six to eight hours, 

suppositories last about 12 hours.  For the typical illness, only one or two 

doses of the medication may be necessary.


     Mild lightheadedness may occur.  This type of medicine can cause 

drowsiness.  Do not drive or operate dangerous machinery while under its 

influence.  Do not mix with alcohol.


     See your doctor at once if you have muscle spasms or tightness, or 

uncontrollable motions (particularly of the neck, mouth, or jaw). Persistent 

vomiting or severe lightheadedness should also be evaluated by the physician.





FOLLOW-UP CARE:


If you have been referred to a physician for follow-up care, call the physician

s office for an appointment as you were instructed or within the next two days.

  If you experience worsening or a significant change in your symptoms (very 

heavy bleeding with large clots of blood, passage of tissue, more severe 

abdominal / pelvic pain or cramping, feeling faint or severe weakness, fever, 

etc.), notify the physician immediately or return to the Emergency Department 

at any time for re-evaluation.





OBSTETRIC-GYNECOLOGIC (OB-GYN) PHYSICIANS IN [place]:





[facility]


    [address] [place] [address]


    [phone]








Prescriptions: 


Promethazine HCl [Phenergan 25 mg Tablet] 1 - 2 tab PO Q6H PRN #30 tablet


 PRN Reason: 


Referrals: 


[facility] [ACTIVE STAFF] - Follow up as needed

## 2018-12-31 ENCOUNTER — HOSPITAL ENCOUNTER (OUTPATIENT)
Dept: HOSPITAL 62 - LC | Age: 33
Discharge: HOME | End: 2018-12-31
Attending: OBSTETRICS & GYNECOLOGY
Payer: MEDICAID

## 2018-12-31 DIAGNOSIS — O42.912: Primary | ICD-10-CM

## 2018-12-31 DIAGNOSIS — Z3A.22: ICD-10-CM

## 2018-12-31 LAB
APPEARANCE UR: CLEAR
APTT PPP: (no result) S
BARBITURATES UR QL SCN: NEGATIVE
BILIRUB UR QL STRIP: NEGATIVE
CHLAM PCR: NOT DETECTED
EPITHELIALS (WET MOUNT): (no result)
GLUCOSE UR STRIP-MCNC: NEGATIVE MG/DL
GON PCR: NOT DETECTED
KETONES UR STRIP-MCNC: NEGATIVE MG/DL
METHADONE UR QL SCN: NEGATIVE
NITRITE UR QL STRIP: NEGATIVE
PCP UR QL SCN: NEGATIVE
PH UR STRIP: 6 [PH] (ref 5–9)
PROT UR STRIP-MCNC: NEGATIVE MG/DL
RBCS (WET MOUNT): (no result)
SP GR UR STRIP: 1
T.VAGINALIS (WET MOUNT): (no result)
URINE AMPHETAMINES SCREEN: NEGATIVE
URINE BENZODIAZEPINES SCREEN: NEGATIVE
URINE COCAINE SCREEN: NEGATIVE
URINE MARIJUANA (THC) SCREEN: NEGATIVE
UROBILINOGEN UR-MCNC: NEGATIVE MG/DL (ref ?–2)
WBCS (WET MOUNT): (no result)
YEAST (WET MOUNT): (no result)

## 2018-12-31 PROCEDURE — 59899 UNLISTED PX MAT CARE&DLVR: CPT

## 2018-12-31 PROCEDURE — 87591 N.GONORRHOEAE DNA AMP PROB: CPT

## 2018-12-31 PROCEDURE — 4A1HXCZ MONITORING OF PRODUCTS OF CONCEPTION, CARDIAC RATE, EXTERNAL APPROACH: ICD-10-PCS | Performed by: OBSTETRICS & GYNECOLOGY

## 2018-12-31 PROCEDURE — 87491 CHLMYD TRACH DNA AMP PROBE: CPT

## 2018-12-31 PROCEDURE — 87210 SMEAR WET MOUNT SALINE/INK: CPT

## 2018-12-31 PROCEDURE — 76815 OB US LIMITED FETUS(S): CPT

## 2018-12-31 PROCEDURE — 81001 URINALYSIS AUTO W/SCOPE: CPT

## 2018-12-31 PROCEDURE — 80307 DRUG TEST PRSMV CHEM ANLYZR: CPT

## 2018-12-31 NOTE — RADIOLOGY REPORT (SQ)
EXAM DESCRIPTION:  U/S OB LIMITED



COMPLETED DATE/TIME:  12/31/2018 11:11 am



REASON FOR STUDY:  leaking fluid



COMPARISON:  None.



TECHNIQUE:  Limited transabdominal grayscale ultrasound for evaluation of specific requested obstetri
susana parameters.



LIMITATIONS:  None.



FINDINGS:  CERVICAL LENGTH: 3.1 cm.   Closed.

KASSIDY: Largest pocket measures 3.9 cm.

FHR: 155 beats per minute.

PRESENTATION: Breech.

PLACENTA: Posterior.

FETAL ANATOMY: Not assessed

OTHER: No other significant findings.



IMPRESSION:  LIMITED OBSTETRICAL ULTRASOUND WITH MEASURED PARAMETERS DELINEATED ABOVE.

Trimester of pregnancy: Second trimester - 13 weeks 1 day to 27 weeks 6 days.



TECHNICAL DOCUMENTATION:  JOB ID:  [id number]

 2011 [employer]- All Rights Reserved



Reading location - IP/workstation name: [email or web address]

## 2019-02-06 ENCOUNTER — HOSPITAL ENCOUNTER (OUTPATIENT)
Dept: HOSPITAL 62 - LC | Age: 34
Discharge: ANOTHER HOSPITAL | End: 2019-02-06
Attending: OBSTETRICS & GYNECOLOGY
Payer: MEDICAID

## 2019-02-06 DIAGNOSIS — Z3A.27: ICD-10-CM

## 2019-02-06 DIAGNOSIS — O42.912: Primary | ICD-10-CM

## 2019-02-06 LAB
ADD MANUAL DIFF: NO
APPEARANCE UR: CLEAR
APTT PPP: YELLOW S
BARBITURATES UR QL SCN: NEGATIVE
BASOPHILS # BLD AUTO: 0.1 10^3/UL (ref 0–0.2)
BASOPHILS NFR BLD AUTO: 0.6 % (ref 0–2)
BILIRUB UR QL STRIP: NEGATIVE
CHLAM PCR: NOT DETECTED
EOSINOPHIL # BLD AUTO: 0 10^3/UL (ref 0–0.6)
EOSINOPHIL NFR BLD AUTO: 0.5 % (ref 0–6)
ERYTHROCYTE [DISTWIDTH] IN BLOOD BY AUTOMATED COUNT: 15 % (ref 11.5–14)
GLUCOSE UR STRIP-MCNC: NEGATIVE MG/DL
GON PCR: NOT DETECTED
HCT VFR BLD CALC: 29.4 % (ref 36–47)
HGB BLD-MCNC: 9.9 G/DL (ref 12–15.5)
KETONES UR STRIP-MCNC: NEGATIVE MG/DL
LYMPHOCYTES # BLD AUTO: 1.8 10^3/UL (ref 0.5–4.7)
LYMPHOCYTES NFR BLD AUTO: 19.2 % (ref 13–45)
MCH RBC QN AUTO: 29.3 PG (ref 27–33.4)
MCHC RBC AUTO-ENTMCNC: 33.8 G/DL (ref 32–36)
MCV RBC AUTO: 87 FL (ref 80–97)
METHADONE UR QL SCN: NEGATIVE
MONOCYTES # BLD AUTO: 1 10^3/UL (ref 0.1–1.4)
MONOCYTES NFR BLD AUTO: 9.9 % (ref 3–13)
NEUTROPHILS # BLD AUTO: 6.7 10^3/UL (ref 1.7–8.2)
NEUTS SEG NFR BLD AUTO: 69.8 % (ref 42–78)
NITRITE UR QL STRIP: NEGATIVE
PCP UR QL SCN: NEGATIVE
PH UR STRIP: 7 [PH] (ref 5–9)
PLATELET # BLD: 184 10^3/UL (ref 150–450)
PROT UR STRIP-MCNC: NEGATIVE MG/DL
RBC # BLD AUTO: 3.39 10^6/UL (ref 3.72–5.28)
RBCS (WET MOUNT): (no result)
SP GR UR STRIP: 1.01
T.VAGINALIS (WET MOUNT): (no result)
TOTAL CELLS COUNTED % (AUTO): 100 %
URINE AMPHETAMINES SCREEN: NEGATIVE
URINE BENZODIAZEPINES SCREEN: NEGATIVE
URINE COCAINE SCREEN: NEGATIVE
URINE MARIJUANA (THC) SCREEN: NEGATIVE
UROBILINOGEN UR-MCNC: 4 MG/DL (ref ?–2)
WBC # BLD AUTO: 9.6 10^3/UL (ref 4–10.5)
WBCS (WET MOUNT): (no result)
YEAST (WET MOUNT): (no result)

## 2019-02-06 PROCEDURE — 96372 THER/PROPH/DIAG INJ SC/IM: CPT

## 2019-02-06 PROCEDURE — 80307 DRUG TEST PRSMV CHEM ANLYZR: CPT

## 2019-02-06 PROCEDURE — 36415 COLL VENOUS BLD VENIPUNCTURE: CPT

## 2019-02-06 PROCEDURE — 87210 SMEAR WET MOUNT SALINE/INK: CPT

## 2019-02-06 PROCEDURE — 59025 FETAL NON-STRESS TEST: CPT

## 2019-02-06 PROCEDURE — 81001 URINALYSIS AUTO W/SCOPE: CPT

## 2019-02-06 PROCEDURE — 85025 COMPLETE CBC W/AUTO DIFF WBC: CPT

## 2019-02-06 PROCEDURE — 76815 OB US LIMITED FETUS(S): CPT

## 2019-02-06 PROCEDURE — 4A1HXCZ MONITORING OF PRODUCTS OF CONCEPTION, CARDIAC RATE, EXTERNAL APPROACH: ICD-10-PCS | Performed by: OBSTETRICS & GYNECOLOGY

## 2019-02-06 PROCEDURE — 87591 N.GONORRHOEAE DNA AMP PROB: CPT

## 2019-02-06 PROCEDURE — 84112 EVAL AMNIOTIC FLUID PROTEIN: CPT

## 2019-02-06 PROCEDURE — 87081 CULTURE SCREEN ONLY: CPT

## 2019-02-06 PROCEDURE — 87491 CHLMYD TRACH DNA AMP PROBE: CPT

## 2019-02-06 PROCEDURE — 59899 UNLISTED PX MAT CARE&DLVR: CPT

## 2019-02-06 NOTE — RADIOLOGY REPORT (SQ)
EXAM DESCRIPTION:  U/S OB LIMITED



COMPLETED DATE/TIME:  2019 6:15 pm



REASON FOR STUDY:   27w4d w  premature ruptured of membranes



COMPARISON:  None.



TECHNIQUE:  Limited transvaginal grayscale ultrasound for evaluation of specific requested obstetrica
l parameters.



LIMITATIONS:  None.



FINDINGS:  CERVICAL LENGTH: 3.7   Closed.

KASSIDY: 6.2 cm.

FHR: 136 beats per minute.

PRESENTATION: Cephalic.

PLACENTA: Not assessed

FETAL ANATOMY: Not assessed

OTHER: No other significant findings.



IMPRESSION:  LIMITED OBSTETRICAL ULTRASOUND WITH MEASURED PARAMETERS DELINEATED ABOVE.

Trimester of pregnancy: Second trimester - 13 weeks 1 day to 27 weeks 6 days.



TECHNICAL DOCUMENTATION:  JOB ID:  [id number]

  [employer]- All Rights Reserved



Reading location - IP/workstation name: [email or web address]

## 2019-02-06 NOTE — PDOC TRANSFER SUMMARY
General


Admission Date: 19


Transfer Date: 19


Accepting Facility: [facility]


Resuscitation Status: Full Code





- Transfer Diagnosis


(1)  premature rupture of membranes


Is this a current diagnosis for this admission?: Yes   





- Transfer Medications


Home Medications: 


                                        





Prenatal Vits96/Iron Fum/Folic [Prenatal Tablet] 1 each PO DAILY 18 








Transfer Medications: 


                               Current Medications





Lactated Ringer's (Lactated Ringers 1000 Ml Iv Soln)  1,000 mls @ 0 mls/hr IV 

CONTINUOUS PRN; Protocol


   PRN Reason: THIS MED IS NOT "PRN"


   Stop: 19 15:24











- Allergies


Allergies/Adverse Reactions: 


                                        





Latex, Natural Rubber Adverse Reaction (Verified 19 15:26)


   











Hospital Course


Hospital Course: 





The pt was sent from the office with ruptured membranes.  This was confirmed by 

exam.





Physical Exam


Vital Signs: 


                                 Intake & Output











 19





 06:59 06:59 06:59


 


Weight   62.5 kg











General appearance: PRESENT: no acute distress, well-developed, well-nourished


Head exam: PRESENT: atraumatic, normocephalic


Respiratory exam: PRESENT: clear to auscultation jose f.  ABSENT: rales, rhonchi, 

wheezes


Cardiovascular exam: PRESENT: RRR.  ABSENT: diastolic murmur, rubs, systolic 

murmur


GI/Abdominal exam: PRESENT: other - gravid


Gentrourinary exam: PRESENT: other - grossly ruptured


Extremities exam: PRESENT: full ROM.  ABSENT: calf tenderness, clubbing, pedal 

edema


Neurological exam: PRESENT: alert, awake, oriented to person, oriented to place,

oriented to time, oriented to situation, CN II-XII grossly intact.  ABSENT: 

motor sensory deficit





Results


Laboratory Results: 


                                        





                                 19 16:17 





                                        











  19





  16:17


 


WBC  9.6


 


RBC  3.39 L


 


Hgb  9.9 L


 


Hct  29.4 L


 


MCV  87


 


MCH  29.3


 


MCHC  33.8


 


RDW  15.0 H


 


Plt Count  184


 


Seg Neutrophils %  69.8


 


Lymphocytes %  19.2


 


Monocytes %  9.9


 


Eosinophils %  0.5


 


Basophils %  0.6


 


Absolute Neutrophils  6.7


 


Absolute Lymphocytes  1.8


 


Absolute Monocytes  1.0


 


Absolute Eosinophils  0.0


 


Absolute Basophils  0.1














Plan


Discharge Plan: 





Plan a transfer

## 2020-12-17 ENCOUNTER — HOSPITAL ENCOUNTER (EMERGENCY)
Dept: HOSPITAL 62 - ER | Age: 35
Discharge: HOME | End: 2020-12-17
Payer: SELF-PAY

## 2020-12-17 VITALS — SYSTOLIC BLOOD PRESSURE: 108 MMHG | DIASTOLIC BLOOD PRESSURE: 53 MMHG

## 2020-12-17 DIAGNOSIS — Z91.040: ICD-10-CM

## 2020-12-17 DIAGNOSIS — F41.9: ICD-10-CM

## 2020-12-17 DIAGNOSIS — M54.41: Primary | ICD-10-CM

## 2020-12-17 DIAGNOSIS — Z98.51: ICD-10-CM

## 2020-12-17 DIAGNOSIS — M79.10: ICD-10-CM

## 2020-12-17 DIAGNOSIS — R00.0: ICD-10-CM

## 2020-12-17 LAB
ADD MANUAL DIFF: NO
ALBUMIN SERPL-MCNC: 4.8 G/DL (ref 3.5–5)
ALP SERPL-CCNC: 66 U/L (ref 38–126)
ANION GAP SERPL CALC-SCNC: 10 MMOL/L (ref 5–19)
APPEARANCE UR: (no result)
APTT PPP: YELLOW S
AST SERPL-CCNC: 25 U/L (ref 14–36)
BARBITURATES UR QL SCN: NEGATIVE
BASOPHILS # BLD AUTO: 0 10^3/UL (ref 0–0.2)
BASOPHILS NFR BLD AUTO: 0.8 % (ref 0–2)
BILIRUB DIRECT SERPL-MCNC: 0 MG/DL (ref 0–0.4)
BILIRUB SERPL-MCNC: 0.9 MG/DL (ref 0.2–1.3)
BILIRUB UR QL STRIP: NEGATIVE
BUN SERPL-MCNC: 13 MG/DL (ref 7–20)
CALCIUM: 10.3 MG/DL (ref 8.4–10.2)
CHLORIDE SERPL-SCNC: 101 MMOL/L (ref 98–107)
CO2 SERPL-SCNC: 26 MMOL/L (ref 22–30)
EOSINOPHIL # BLD AUTO: 0.1 10^3/UL (ref 0–0.6)
EOSINOPHIL NFR BLD AUTO: 1.2 % (ref 0–6)
ERYTHROCYTE [DISTWIDTH] IN BLOOD BY AUTOMATED COUNT: 14.6 % (ref 11.5–14)
GLUCOSE SERPL-MCNC: 97 MG/DL (ref 75–110)
GLUCOSE UR STRIP-MCNC: NEGATIVE MG/DL
HCT VFR BLD CALC: 36.9 % (ref 36–47)
HGB BLD-MCNC: 12.3 G/DL (ref 12–15.5)
KETONES UR STRIP-MCNC: 20 MG/DL
LYMPHOCYTES # BLD AUTO: 1.9 10^3/UL (ref 0.5–4.7)
LYMPHOCYTES NFR BLD AUTO: 36.8 % (ref 13–45)
MCH RBC QN AUTO: 28.6 PG (ref 27–33.4)
MCHC RBC AUTO-ENTMCNC: 33.3 G/DL (ref 32–36)
MCV RBC AUTO: 86 FL (ref 80–97)
METHADONE UR QL SCN: NEGATIVE
MONOCYTES # BLD AUTO: 0.3 10^3/UL (ref 0.1–1.4)
MONOCYTES NFR BLD AUTO: 6.6 % (ref 3–13)
NEUTROPHILS # BLD AUTO: 2.9 10^3/UL (ref 1.7–8.2)
NEUTS SEG NFR BLD AUTO: 54.6 % (ref 42–78)
NITRITE UR QL STRIP: NEGATIVE
PCP UR QL SCN: NEGATIVE
PH UR STRIP: 6 [PH] (ref 5–9)
PLATELET # BLD: 225 10^3/UL (ref 150–450)
POTASSIUM SERPL-SCNC: 4 MMOL/L (ref 3.6–5)
PROT SERPL-MCNC: 8 G/DL (ref 6.3–8.2)
PROT UR STRIP-MCNC: 30 MG/DL
RBC # BLD AUTO: 4.29 10^6/UL (ref 3.72–5.28)
SP GR UR STRIP: 1.03
TOTAL CELLS COUNTED % (AUTO): 100 %
URINE AMPHETAMINES SCREEN: NEGATIVE
URINE BENZODIAZEPINES SCREEN: NEGATIVE
URINE COCAINE SCREEN: NEGATIVE
URINE MARIJUANA (THC) SCREEN: NEGATIVE
UROBILINOGEN UR-MCNC: 2 MG/DL (ref ?–2)
WBC # BLD AUTO: 5.3 10^3/UL (ref 4–10.5)

## 2020-12-17 PROCEDURE — 87086 URINE CULTURE/COLONY COUNT: CPT

## 2020-12-17 PROCEDURE — 80307 DRUG TEST PRSMV CHEM ANLYZR: CPT

## 2020-12-17 PROCEDURE — 36415 COLL VENOUS BLD VENIPUNCTURE: CPT

## 2020-12-17 PROCEDURE — 72148 MRI LUMBAR SPINE W/O DYE: CPT

## 2020-12-17 PROCEDURE — 81025 URINE PREGNANCY TEST: CPT

## 2020-12-17 PROCEDURE — 96365 THER/PROPH/DIAG IV INF INIT: CPT

## 2020-12-17 PROCEDURE — 85025 COMPLETE CBC W/AUTO DIFF WBC: CPT

## 2020-12-17 PROCEDURE — 80053 COMPREHEN METABOLIC PANEL: CPT

## 2020-12-17 PROCEDURE — 99285 EMERGENCY DEPT VISIT HI MDM: CPT

## 2020-12-17 PROCEDURE — 96375 TX/PRO/DX INJ NEW DRUG ADDON: CPT

## 2020-12-17 PROCEDURE — 96361 HYDRATE IV INFUSION ADD-ON: CPT

## 2020-12-17 PROCEDURE — 81001 URINALYSIS AUTO W/SCOPE: CPT

## 2020-12-17 PROCEDURE — 74176 CT ABD & PELVIS W/O CONTRAST: CPT

## 2020-12-17 NOTE — RADIOLOGY REPORT (SQ)
EXAM DESCRIPTION:  CT ABD/PELVIS NO ORAL OR IV



IMAGES COMPLETED DATE/TIME:  12/17/2020 1:05 pm



REASON FOR STUDY:  Low back pain right flank. ? lumbar



COMPARISON:  None.



TECHNIQUE:  CT scan of the abdomen and pelvis performed without intravenous or oral contrast. Images 
reviewed with lung, soft tissue, and bone windows. Reconstructed coronal and sagittal MPR images revi
ewed. All images stored on PACS.

All CT scanners at this facility use dose modulation, iterative reconstruction, and/or weight based d
osing when appropriate to reduce radiation dose to as low as reasonably achievable (ALARA).

[facility]: Dose Right  [facility]: CareDose    [facility]: Dose Right    [facility]: Teradose 4D    [facility]: Smart Technologies



RADIATION DOSE:  DLP 504mGy.



LIMITATIONS:  Suboptimal evaluation of the vasculature and solid organs due to lack of IV contrast.



FINDINGS:  LOWER CHEST: Lung bases are clear.

NON-CONTRASTED LIVER, SPLEEN, ADRENALS: Evaluation limited by lack of IV contrast. No identified sign
ificant masses.

PANCREAS: No masses. No peripancreatic inflammatory changes.

GALLBLADDER: No identified stones by CT criteria. No inflammatory changes to suggest cholecystitis.

RIGHT KIDNEY AND URETER: No suspicious masses. Assessment limited by lack of IV contrast.   No signif
icant calcifications.   No hydronephrosis or hydroureter.

LEFT KIDNEY AND URETER: No suspicious masses. Assessment limited by lack of IV contrast.   No signifi
cant calcifications.   No hydronephrosis or hydroureter.

AORTA AND RETROPERITONEUM: No aneurysm. No retroperitoneal masses or adenopathy.

BOWEL AND PERITONEAL CAVITY: No obvious masses or inflammatory changes. No free fluid.  Moderate stoo
l in the colon.

APPENDIX: Not definitively visualized.  No pericecal fat stranding.

PELVIS, BLADDER, AND ABDOMINAL WALL:No abnormal masses. No free fluid. Bladder normal.

BONES: No significant findings.

OTHER: No other significant finding.



IMPRESSION:  No acute abnormality on noncontrast CT of the abdomen and pelvis.  No obstructive uropat
hy.



COMMENT:  Quality ID # 436: Final reports with documentation of one or more dose reduction techniques
 (e.g., Automated exposure control, adjustment of the mA and/or kV according to patient size, use of 
iterative reconstruction technique)



TECHNICAL DOCUMENTATION:  JOB ID:  [id number]

 2011 [employer]- All Rights Reserved



Reading location - IP/workstation name: [email or web address]

## 2020-12-17 NOTE — ER DOCUMENT REPORT
ED Neck/Back Problem





- General


Chief Complaint: Back Pain


Stated Complaint: BACK PAIN, HEADACHE,NAUSEA


Time Seen by Provider: 12/17/20 14:34


Primary Care Provider: 


CESAR VILLALTA MD [ACTIVE STAFF] - Follow up as needed


Mode of Arrival: Ambulatory


Information source: Patient


Notes: 





Patient is a 35-year-old female comes emergency room with complaint of severe 

back pain.  Patient states she woke up with mild back pain yesterday and this 

morning she has increasing discomfort and pain in the low back radiating down 

the right leg.  She has a history of back discomfort but nothing is ever been 

this bad in the past.  She denies any loss of urine or stool.  She works as a 

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  She does state that

she does some heavy lifting mostly spinal trays but has not done any kind of 

work like that in a couple weeks.  She last worked on Monday.  Last menstrual 

period was on December 6 was her last day.  She has had a tubal ligation in the 

past.  She denies any known other traumatic events.  Patient cannot find a po

sition of comfort.  She is unable to lay down completely secondary to the pain.


TRAVEL OUTSIDE OF THE [place] IN LAST 30 DAYS: No





- HPI


Patient complains to provider of: Pain, Lower back.  No: Injury


Onset: Yesterday


Onset: Gradual


Timing: Constant, Still present, Worse


Quality of pain: Cramping, Sharp


Severity: Severe


Pain Level: 4


Context: Other - Unknown


Recent injury: No


Associated symptoms: Numbness/tingling, Radiation to leg, Lower back pain


Exacerbated by: Other - Sitting or laying down


Relieved by: Other - Standing


Similar symptoms previously: No


Recently seen / treated by doctor: No





- Related Data


Allergies/Adverse Reactions: 


                                        





Latex, Natural Rubber Adverse Reaction (Verified 12/17/20 13:42)


   











Past Medical History





- General


Information source: Patient





- Social History


Smoking Status: Never Smoker


Frequency of alcohol use: None


Drug Abuse: None


Family History: Reviewed & Not Pertinent





- Past Medical History


Cardiac Medical History: 


   Denies: Hx Coronary Artery Disease, Hx DVT, Hx Pulmonary Embolism


Neurological Medical History: Reports: Hx Migraine


Renal/ Medical History: Denies: Hx Peritoneal Dialysis


Past Surgical History: Reports: Hx Abdominal Surgery - hernia, Hx Gynecologic 

Surgery - c/s, Hx Herniorrhaphy





- Immunizations


Hx Diphtheria, Pertussis, Tetanus Vaccination: Yes





Review of Systems





- Review of Systems


Constitutional: No symptoms reported


EENT: No symptoms reported


Cardiovascular: No symptoms reported


Respiratory: No symptoms reported


Gastrointestinal: No symptoms reported


Genitourinary: No symptoms reported


Female Genitourinary: No symptoms reported


Musculoskeletal: See HPI, Back pain, Muscle pain


Skin: No symptoms reported


Hematologic/Lymphatic: No symptoms reported


Neurological/Psychological: No symptoms reported


-: Yes All other systems reviewed and negative





Physical Exam





- Vital signs


Vitals: 


                                        











Temp Pulse Resp BP Pulse Ox


 


 97.7 F   112 H  18   109/78   100 


 


 12/17/20 13:00  12/17/20 13:00  12/17/20 13:00  12/17/20 13:00  12/17/20 13:00











Interpretation: Hypotensive, Tachycardic





- Notes


Notes: 





PHYSICAL EXAMINATION:





GENERAL: Patient is a well-nourished well-developed 35-year-old female no appar

ent distress but is very uncomfortable appearing.





HEAD: Atraumatic, normocephalic.





EYES: Pupils equal round and reactive to light, extraocular movements intact, 

conjunctiva are normal.





ENT: Nares patent, oropharynx clear without exudates.  Moist mucous membranes.





NECK: Normal range of motion, supple without lymphadenopathy no nuchal rigidity 

noted.  Patient has full range of motion without any discomfort.





LUNGS: Breath sounds clear to auscultation bilaterally and equal.  No wheezes 

rales or rhonchi.





HEART: Tachycardic rate and rhythm without murmurs





ABDOMEN: Soft, nontender, nondistended abdomen.  No guarding, no rebound.  No 

masses appreciated.





Female : deferred





Musculoskeletal: Examination patient's her concern is her low back and right 

leg.  Very difficult to examine patient as an only position of comfort is 

standing.  However I was able to get patient to lay down for my examination.  

Patient has good sensation in bilateral lower extremities from the inner ankles 

and feet to the groin as she does from the outer ankles and feet to the hips.  

She has no sign of saddle paresthesia.  She has no foot drop when walking.  

Patient has good DTRs bilateral lower extremities.  She has good strength 

against resistance in bilateral lower extremities.  She has positive straight l

eg raise on the right side only to about 25 degrees.  Palpation of the lumbar 

spine does show some mild tenderness around L4-L5 to palpation but no 

paravertebral spasms are felt.  She does have some tenderness at the sciatic 

notch on the right side.





NEUROLOGICAL:  Normal speech,   Normal sensory, motor exams





PSYCH: Normal mood, normal affect.





SKIN: Warm, Dry, normal turgor, no rashes or lesions noted.





Course





- Re-evaluation


Re-evalutation: 


I did discuss the case with Dr. Morrison who had me order a LS spine MRI.  It 

came back negative.  As did most of her lab work all normal.  No sign of urinary

tract infection.  White count was normal.  I originally gave patient 4 of 

morphine and once discussing the case with Dr. Morrison he instructed me to give

2 mg of Dilaudid with 1 g of Robaxin and Decadron.  I did this while patient 

went to her MRI along with 0.5 of Ativan because of her anxiety secondary to the

MRI scan. reevaluation the patient also showed that she was much more 

comfortable at this time.  I rediscussed the case with Dr. Morrison and he felt 

we could send patient home with muscle relaxers and steroids.  Patient was in 

agreement to this treatment plan and she will follow-up with her primary care 

provider and have also given her the name of the orthopedist on-call to contact 

him if the pain continues.  She is also been instructed to return to ER should 

she have increasing pain and discomfort loss of urine or stool or difficulty 

walking.


12/18/20 01:06








- Vital Signs


Vital signs: 


                                        











Temp Pulse Resp BP Pulse Ox


 


 97.5 F   87   16   108/53 L  100 


 


 12/17/20 20:16  12/17/20 20:16  12/17/20 20:16  12/17/20 20:16  12/17/20 20:16














- Laboratory Results


Result Diagrams: 


                                 12/17/20 15:09





                                 12/17/20 15:09


Laboratory Results Interpreted: 


                                        











  12/17/20 12/17/20 12/17/20





  15:09 15:09 15:09


 


RDW  14.6 H  


 


Calcium   10.3 H 


 


Urine Protein    30 H


 


Urine Ketones    20 H


 


Urine Urobilinogen    2.0 H











Critical Laboratory Results Reviewed: No Critical Results





- Radiology Results


Critical Radiology Results Reviewed: No Critical Results





Discharge





- Discharge


Clinical Impression: 


 Piriformis syndrome of right side





Sciatica


Qualifiers:


 Laterality: right Qualified Code(s): M54.31 - Sciatica, right side





Condition: Stable


Disposition: HOME, SELF-CARE


Instructions:  Low Back Pain ([facility]), Muscle Relaxers ([facility]), Muscle Strain ([facility]), 

Sciatica ([facility])


Additional Instructions: 


Home and rest.  Medications prescribed.  You can also take ibuprofen 800 mg 3 

times a day for pain discomfort.  He can return to work on Monday light duty no 

heavy lifting for 1 week.  I asked that she have a primary care doctor highly 

suggest contacting him tomorrow to set up an appointment prior to going back to 

work.  Should you have any concerns or problems or if you have any loss of urine

or stool or loss of feeling return to ER for reevaluation.


Prescriptions: 


Methylprednisolone [Medrol Dosepack (4 mg/Tab) 21 Tab/Dosepak] 21 tab PO ASDIR 

PRN 6 Days #1 dspk


 PRN Reason: 


Methocarbamol [Robaxin 750 mg Tablet] 750 mg PO ASDIR PRN #40 tablet


 PRN Reason: 


Forms:  Return to Work


Referrals: 


CESAR VILLALTA MD [ACTIVE STAFF] - Follow up as needed

## 2020-12-17 NOTE — RADIOLOGY REPORT (SQ)
EXAM DESCRIPTION:  MRI LUMBAR SPINE WITHOUT



IMAGES COMPLETED DATE/TIME:  12/17/2020 4:06 pm



REASON FOR STUDY:  Severe back pain radiation right leg



COMPARISON:  None.



TECHNIQUE:  Sagittal and Axial imaging includes T1, T2, STIR and gradient echo sequences. Coronal T2/
HASTE imaging.



LIMITATIONS:  None.



FINDINGS:  VISUALIZED UPPER ABDOMEN:  Limited evaluation. No acute or suspicious findings suggested.

SEGMENTATION: No transitional anatomy. The lowest well-developed disc space is labeled L5-S1.

ALIGNMENT: Anatomic.

VERTEBRAE: Intact.

BONE MARROW: Normal. No marrow replacement or reactive changes.

DISC SIGNAL: Normal. No significant abnormal signal or loss of height.

POSTERIOR ELEMENTS:  Generally intact.  No pars defect evident.

HARDWARE: None in the spine.

CORD AND CONUS: Normal in size and signal intensity. Conus at the appropriate level.

SOFT TISSUES: No aortic aneurysm seen. No bulky retroperitoneal adenopathy or mass. No paraspinal mas
s or fluid.

L1-L2: No significant spinal stenosis or exit foraminal stenosis.

L2-L3: No significant spinal stenosis or exit foraminal stenosis.

L3-L4: No significant spinal stenosis or exit foraminal stenosis.

L4-L5: No significant spinal stenosis or exit foraminal stenosis.

L5-S1: No significant spinal stenosis or exit foraminal stenosis.

LOWER THORACIC: Incompletely imaged.  No stenosis seen.

SACRUM: Visualized upper sacrum intact.

OTHER: No other significant findings.



IMPRESSION:  NORMAL MRI LUMBAR SPINE.



TECHNICAL DOCUMENTATION:  JOB ID:  [id number]

 [employer]- All Rights Reserved



Reading location - IP/workstation name: [email or web address]